# Patient Record
Sex: MALE | Race: WHITE | NOT HISPANIC OR LATINO | Employment: UNEMPLOYED | URBAN - METROPOLITAN AREA
[De-identification: names, ages, dates, MRNs, and addresses within clinical notes are randomized per-mention and may not be internally consistent; named-entity substitution may affect disease eponyms.]

---

## 2020-05-15 ENCOUNTER — OFFICE VISIT CONVERTED (OUTPATIENT)
Dept: SURGERY | Facility: CLINIC | Age: 55
End: 2020-05-15
Attending: SURGERY

## 2020-06-24 ENCOUNTER — HOSPITAL ENCOUNTER (OUTPATIENT)
Dept: PERIOP | Facility: HOSPITAL | Age: 55
Setting detail: HOSPITAL OUTPATIENT SURGERY
Discharge: HOME | End: 2020-06-24
Attending: SURGERY

## 2020-06-24 LAB
ANION GAP SERPL CALC-SCNC: 16 MMOL/L (ref 8–19)
BUN SERPL-MCNC: 10 MG/DL (ref 5–25)
BUN/CREAT SERPL: 12 {RATIO} (ref 6–20)
CALCIUM SERPL-MCNC: 9.3 MG/DL (ref 8.7–10.4)
CHLORIDE SERPL-SCNC: 103 MMOL/L (ref 99–111)
CONV CO2: 24 MMOL/L (ref 22–32)
CREAT UR-MCNC: 0.81 MG/DL (ref 0.7–1.2)
GFR SERPLBLD BASED ON 1.73 SQ M-ARVRAT: >60 ML/MIN/{1.73_M2}
GLUCOSE BLD-MCNC: 203 MG/DL (ref 70–99)
GLUCOSE SERPL-MCNC: 146 MG/DL (ref 70–99)
OSMOLALITY SERPL CALC.SUM OF ELEC: 290 MOSM/KG (ref 273–304)
POTASSIUM SERPL-SCNC: 4.1 MMOL/L (ref 3.5–5.3)
SARS-COV-2 RNA SPEC QL NAA+PROBE: NOT DETECTED
SODIUM SERPL-SCNC: 139 MMOL/L (ref 135–147)

## 2020-07-07 ENCOUNTER — OFFICE VISIT CONVERTED (OUTPATIENT)
Dept: SURGERY | Facility: CLINIC | Age: 55
End: 2020-07-07
Attending: SURGERY

## 2020-07-21 ENCOUNTER — OFFICE VISIT CONVERTED (OUTPATIENT)
Dept: SURGERY | Facility: CLINIC | Age: 55
End: 2020-07-21
Attending: SURGERY

## 2020-07-21 ENCOUNTER — CONVERSION ENCOUNTER (OUTPATIENT)
Dept: SURGERY | Facility: CLINIC | Age: 55
End: 2020-07-21

## 2020-09-28 ENCOUNTER — HOSPITAL ENCOUNTER (OUTPATIENT)
Dept: OTHER | Facility: HOSPITAL | Age: 55
Discharge: HOME | End: 2020-09-28
Attending: NURSE PRACTITIONER

## 2020-09-28 LAB — TESTOST SERPL-MCNC: 356 NG/DL (ref 193–740)

## 2020-10-01 LAB — TESTOSTERONE, FREE: 7.1 PG/ML (ref 7.2–24)

## 2021-05-10 NOTE — H&P
History and Physical      Patient Name: Hunter Guevara   Patient ID: [id number]   Sex: Male   YOB: 1965    Referring Provider: Jude MOORE    Visit Date: May 15, 2020    Provider: Melecio Burger MD   Location[facility]   Location Address: [address] [place] [address]  [id number]   Location Phone: [phone]          Chief Complaint  · Outpatient History & Physical / Surgical Orders  · Hernia Consult      History Of Present Illness     Mr. Guevara came in today for evaluation. He is a very nice gentleman who has a symptomatic left inguinal hernia and he also has a small hernia at his umbilicus.            Past Medical History  Allergic rhinitis, chronic; Diabetes; Diabetes mellitus; Gout; High blood pressure; High cholesterol; Hyperlipidemia; Hypertension; Left Shoulder Rotator Cuff Arthrosis; Lumbago/low back pain         Past Surgical History  *Metal Implant; Ankle surgery; Cyst Removal; EYE SURGERY; Tonsillectomy         Medication List  allopurinol 100 mg oral tablet; atorvastatin 20 mg oral tablet; glyburide 5 mg oral tablet; lisinopril 20 mg oral tablet; loratadine 10 mg oral tablet; metformin 500 mg oral tablet; Osteo Bi-Flex 250-200 mg oral tablet; vitamin B complex oral tablet         Allergy List  PENICILLINS         Family Medical History  Stroke; Diabetes, unspecified type; Hypertension; Coronary artery disease         Social History  Alcohol Use; Denies substance abuse; .; lives with other; Metal in your body?; Smokeless tobacco (Current every day); Tobacco (Current some day); Unemployed.         Review of Systems  · Constitutional  o Denies  o : fever  · Eyes  o Denies  o : yellowish discoloration of eyes  · HENT  o Denies  o : difficulty swallowing  · Cardiovascular  o Denies  o : chest pain on exertion  · Respiratory  o Denies  o : shortness of breath  · Gastrointestinal  o Denies  o : nausea, vomiting, diarrhea, constipation  · Integument  o Admits  o :  "skin lesion or lump  o Denies  o : rash  · Neurologic  o Denies  o : tingling or numbness  · Musculoskeletal  o Denies  o : joint pain  · Endocrine  o Denies  o : weight gain, weight loss      Vitals  Date Time BP Position Site L\R Cuff Size HR RR TEMP (F) WT  HT  BMI kg/m2 BSA m2 O2 Sat        05/15/2020 09:33 AM       16  175lbs 16oz 5'  7\" 27.57 1.94           Physical Examination  · Constitutional  o Appearance  o : well-nourished, well developed, alert, in no acute distress  · Head and Face  o Head  o :   § Inspection  § : atraumatic, normocephalic  · Neck  o Inspection/Palpation  o : supple, normal range of motion  · Respiratory  o Inspection of Chest  o : normal inspection  o Auscultation of Lungs  o : breath sounds normal, no distress, clear to ascultate bilaterally  · Cardiovascular  o Heart  o :   § Auscultation of Heart  § : regular rate and rhythm, no murmur, gallop or rub  · Gastrointestinal  o Abdominal Examination  o : normal bowel sounds, non-tender, soft. Umbilical hernia noted.  · Groin  o Groin  o : [facility] noted          Assessment  · Pre-Surgical Orders     V72.84  · Non-recurrent unilateral inguinal hernia without obstruction or gangrene     550.90/K40.90  · Hernia, Umibilical     553.1       Symptomatic left inguinal hernia. He also has a small but symptomatic umbilical hernia.       Plan  · Orders  o Surgery Order (GENOR) - V72.84, 550.90/K40.90, 553.1 - 06/24/2020  o [facility] Pre-Op Covid-19 Screening (34589) - V72.84, 550.90/K40.90, 553.1 - 06/22/2020   Scheduled at [facility] at 8am.   · Medications  o Medications have been Reconciled  o Transition of Care or Provider Policy  · Instructions  o ****Surgical Orders****  o Outpatient  o RISK AND BENEFITS:  o Consent for surgery: Given these options, the patient has verbally expressed an understanding of the risks of surgery and finds these risks acceptable. We will proceed with surgery as soon as possible.  o Consult Anesthesia for any post-operative " block, or any pain management procedure deemed necessary by the anestesiologist for adequate post-operative pain control.   o O.R. PREP: Per protocol  o SCD's preoperatively  o PLEASE SIGN PERMIT FOR: Robotic left inguinal hernia repair/ Laparoscopic umbilical hernia repair  o *__Clindamycin 900 mg IV on call to OR.  o *___The above History and Physical Examination has been completed within 30 days of admission.     We are going to set him up for a robotic left inguinal hernia. We will also fix his umbilical hernia laparoscopically. I have described the procedure to him as well as the risks and benefits and he is agreeable to proceeding.             Electronically Signed by: Susu Hopkins-, -Author on May 18, 2020 08:22:29 AM  Electronically Co-signed by: Melecio Burger MD -Reviewer on May 28, 2020 09:42:24 AM

## 2021-05-13 NOTE — PROGRESS NOTES
"   Progress Note      Patient Name: Hunter Guevara   Patient ID: [id number]   Sex: Male   YOB: 1965    Primary Care Provider: Jude MOORE   Referring Provider: Jude MOORE    Visit Date: July 7, 2020    Provider: Melecio Burger MD   Location: [facility]   Location Address: [address] [place] [address]  [id number]   Location Phone: [phone]          History Of Present Illness     Mr. Guevara came back for evaluation.  He is a very nice gentleman who had a bilateral robotic inguinal hernia repair and he also had a laparoscopic umbilical hernia repair.  He is still a bit sore but otherwise is doing okay.  He is having a little bit of issue with constipation.       Review of Systems  · Cardiovascular  o Denies  o : chest pain, irregular heart beats, rapid heart rate, chest pain on exertion, shortness of breath, lower extremity swelling  · Respiratory  o Denies  o : shortness of breath, wheezing, cough, wheezing, chronic cough, coughing up blood  · Gastrointestinal  o Denies  o : nausea, vomiting, diarrhea, chronic abdominal pain, reflux symptoms      Vitals  Date Time BP Position Site L\R Cuff Size HR RR TEMP (F) WT  HT  BMI kg/m2 BSA m2 O2 Sat HC       07/07/2020 01:16 PM       16  176lbs 16oz 5'  7\" 27.72 1.95           Physical Examination     Today on physical exam he has a seroma there in his left groin but otherwise everything looks fine.           Assessment  · Postoperative Exam Following Surgery     V67.00       Doing fine status post bilateral robotic inguinal hernia repair.  He also had a laparoscopic umbilical hernia repair.       Plan  · Medications  o Medications have been Reconciled  o Transition of Care or Provider Policy  · Instructions  o Follow up in 2 weeks.     We will see him back in two weeks.  We will go ahead and prescribe him some Colace for a stool softener.             Electronically Signed by: Bridgett Scott-, Other -Author on " July 8, 2020 07:19:30 PM  Electronically Co-signed by: Melecio Burger MD -Reviewer on July 12, 2020 09:08:23 AM

## 2021-05-13 NOTE — PROGRESS NOTES
Progress Note      Patient Name: Hunter Guevara   Patient ID: [id number]   Sex: Male   YOB: 1965    Primary Care Provider: Jude MOORE   Referring Provider: Jude MOORE    Visit Date: July 21, 2020    Provider: Melecio Burger MD   Location: [facility]   Location Address: [address] [place] [address]  [id number]   Location Phone: [phone]          Chief Complaint  · Follow Up Office Visit      History Of Present Illness     Hutner came back for follow-up. He is doing well following a robotic repair of a bilateral inguinal hernia and a laparoscopic umbilical hernia repair. He is doing great. His soreness has basically resolved. He is able to get out and pretty mobile.       Past Medical History  Allergic rhinitis, chronic; Diabetes; Diabetes mellitus; Gout; High blood pressure; High cholesterol; Hyperlipidemia; Hypertension; Left Shoulder Rotator Cuff Arthrosis; Lumbago/low back pain         Past Surgical History  *Metal Implant; Ankle surgery; Cyst Removal; EYE SURGERY; Hernia; Tonsillectomy         Medication List  allopurinol 100 mg oral tablet; atorvastatin 20 mg oral tablet; Colace 100 mg oral capsule; glyburide 5 mg oral tablet; lisinopril 20 mg oral tablet; loratadine 10 mg oral tablet; metformin 500 mg oral tablet; Osteo Bi-Flex 250-200 mg oral tablet; vitamin B complex oral tablet         Allergy List  PENICILLINS         Family Medical History  Stroke; Diabetes, unspecified type; Hypertension; Coronary artery disease         Social History  Alcohol Use; Denies substance abuse; .; lives with other; Metal in your body?; Smokeless tobacco (Current every day); Tobacco (Current some day); Unemployed.         Review of Systems  · Cardiovascular  o Denies  o : chest pain, irregular heart beats, rapid heart rate, chest pain on exertion, shortness of breath, lower extremity swelling  · Respiratory  o Denies  o : shortness of breath, wheezing, cough, wheezing,  "chronic cough, coughing up blood  · Gastrointestinal  o Denies  o : nausea, vomiting, diarrhea, chronic abdominal pain, reflux symptoms      Vitals  Date Time BP Position Site L\R Cuff Size HR RR TEMP (F) WT  HT  BMI kg/m2 BSA m2 O2 Sat HC       07/21/2020 01:03 PM       16  176lbs 16oz 5'  7\" 27.72 1.95           Physical Examination     Today on physical exam, all of his incisions look good.           Assessment  · Postoperative Exam Following Surgery     V67.00       Doing great status post robotic bilateral inguinal hernia repair as well as a laparoscopic umbilical hernia repair.       Plan  · Medications  o Medications have been Reconciled  o Transition of Care or Provider Policy  · Instructions  o Follow up as needed.            Electronically Signed by: Susu Hopkins-, -Author on July 22, 2020 10:01:35 AM  Electronically Co-signed by: Melecio Burger MD -Reviewer on July 27, 2020 02:34:38 PM  "

## 2021-05-15 VITALS — RESPIRATION RATE: 16 BRPM | HEIGHT: 67 IN | WEIGHT: 177 LBS | BODY MASS INDEX: 27.78 KG/M2

## 2021-05-15 VITALS — HEIGHT: 67 IN | BODY MASS INDEX: 27.62 KG/M2 | WEIGHT: 176 LBS | RESPIRATION RATE: 16 BRPM

## 2021-05-15 VITALS — RESPIRATION RATE: 16 BRPM | WEIGHT: 177 LBS | BODY MASS INDEX: 27.78 KG/M2 | HEIGHT: 67 IN

## 2022-03-28 ENCOUNTER — PREP FOR SURGERY (OUTPATIENT)
Dept: OTHER | Facility: HOSPITAL | Age: 57
End: 2022-03-28

## 2022-03-28 ENCOUNTER — OFFICE VISIT (OUTPATIENT)
Dept: ORTHOPEDIC SURGERY | Facility: CLINIC | Age: 57
End: 2022-03-28

## 2022-03-28 VITALS — HEART RATE: 78 BPM | OXYGEN SATURATION: 97 % | BODY MASS INDEX: 28.25 KG/M2 | WEIGHT: 180 LBS | HEIGHT: 67 IN

## 2022-03-28 DIAGNOSIS — M17.12 PRIMARY OSTEOARTHRITIS OF LEFT KNEE: Primary | ICD-10-CM

## 2022-03-28 DIAGNOSIS — M25.562 LEFT KNEE PAIN, UNSPECIFIED CHRONICITY: ICD-10-CM

## 2022-03-28 PROCEDURE — 99204 OFFICE O/P NEW MOD 45 MIN: CPT | Performed by: ORTHOPAEDIC SURGERY

## 2022-03-28 RX ORDER — MULTIPLE VITAMINS W/ MINERALS TAB 9MG-400MCG
1 TAB ORAL DAILY
COMMUNITY

## 2022-03-28 RX ORDER — POVIDONE-IODINE 10 MG/ML
SOLUTION TOPICAL ONCE
Status: CANCELLED | OUTPATIENT
Start: 2022-03-28 | End: 2022-03-28

## 2022-03-28 RX ORDER — ERGOCALCIFEROL 1.25 MG/1
50000 CAPSULE ORAL
COMMUNITY
Start: 2022-02-10

## 2022-03-28 RX ORDER — TRANEXAMIC ACID 10 MG/ML
1000 INJECTION, SOLUTION INTRAVENOUS ONCE
Status: CANCELLED | OUTPATIENT
Start: 2022-03-28 | End: 2022-03-28

## 2022-03-28 RX ORDER — ATORVASTATIN CALCIUM 40 MG/1
40 TABLET, FILM COATED ORAL NIGHTLY
COMMUNITY
Start: 2022-01-19

## 2022-03-28 RX ORDER — CEFAZOLIN SODIUM 2 G/100ML
2 INJECTION, SOLUTION INTRAVENOUS ONCE
Status: CANCELLED | OUTPATIENT
Start: 2022-03-28 | End: 2022-03-28

## 2022-03-28 RX ORDER — GLYBURIDE 5 MG/1
5 TABLET ORAL
COMMUNITY
Start: 2022-01-19

## 2022-03-28 RX ORDER — CEFAZOLIN SODIUM IN 0.9 % NACL 3 G/100 ML
3 INTRAVENOUS SOLUTION, PIGGYBACK (ML) INTRAVENOUS ONCE
Status: CANCELLED | OUTPATIENT
Start: 2022-03-28 | End: 2022-03-28

## 2022-03-28 RX ORDER — ALLOPURINOL 100 MG/1
100 TABLET ORAL DAILY
COMMUNITY
Start: 2022-01-19

## 2022-03-28 RX ORDER — LISINOPRIL 20 MG/1
20 TABLET ORAL DAILY
COMMUNITY
Start: 2022-01-19

## 2022-03-28 RX ORDER — TESTOSTERONE CYPIONATE 200 MG/ML
INJECTION, SOLUTION INTRAMUSCULAR
COMMUNITY
Start: 2022-02-11 | End: 2022-05-31

## 2022-03-28 NOTE — PROGRESS NOTES
"Chief Complaint  Initial Evaluation of the Left Knee     Subjective      Hunter Guevaar presents to [facility] for an evaluation of left knee. Patient has pain and swelling in the left knee. Patient has difficulty with steps and prolonged ambulation. He does limp due to the pain. He has difficulty with performing ADLs.     Allergies   Allergen Reactions   • Penicillins Hives        Social History     Socioeconomic History   • Marital status:    Tobacco Use   • Smoking status: Current Every Day Smoker     Types: Cigars   • Smokeless tobacco: Former User        Review of Systems     Objective   Vital Signs:   Pulse 78   Ht 170.2 cm (67\")   Wt 81.6 kg (180 lb)   SpO2 97%   BMI 28.19 kg/m²       Physical Exam  Constitutional:       Appearance: Normal appearance. Patient is well-developed and normal weight.   HENT:      Head: Normocephalic.      Right Ear: Hearing and external ear normal.      Left Ear: Hearing and external ear normal.      Nose: Nose normal.   Eyes:      Conjunctiva/sclera: Conjunctivae normal.   Cardiovascular:      Rate and Rhythm: Normal rate.   Pulmonary:      Effort: Pulmonary effort is normal.      Breath sounds: No wheezing or rales.   Abdominal:      Palpations: Abdomen is soft.      Tenderness: There is no abdominal tenderness.   Musculoskeletal:      Cervical back: Normal range of motion.   Skin:     Findings: No rash.   Neurological:      Mental Status: Patient  is alert and oriented to person, place, and time.   Psychiatric:         Mood and Affect: Mood and affect normal.         Judgment: Judgment normal.       Ortho Exam      LEFT KNEE: Calf supple, non-tender, no signs of DVT. Dorsal Pedal Pulse 2+, posterior tibialis pulse 2+. Good strength to hamstrings, quadriceps, dorsiflexors and plantar flexors. Stable to varus/valgus stress. Stable anterior and posterior drawer. Tender medial and lateral joint line. Varus alignment. Full extension. " Flexion to 115 degrees.       Procedures        Imaging Results (Most Recent)     Procedure Component Value Units Date/Time    XR Knee 3 View Left [[id number]] Resulted: 03/28/22 1405     Updated: 03/28/22 1408           Result Review :     X-Ray Report:  Left knee(s) X-Ray  Indication: Evaluation of left knee pain   AP, Lateral and Standing view(s)  Findings: Severe osteoarthritis of the left knee. No fracture or dislocation.   Prior studies available for comparison: no     Assessment and Plan     DX: left knee osteoarthritis     Discussed treatment plans and diagnosis with the patient. He is a candidate for a left total knee replacement. Patient wishes to proceed with a left total knee replacement.     Discussed surgery., Risks/benefits discussed with patient including, but not limited to: infection, bleeding, neurovascular damage, malunion, nonunion, aesthetic deformity, need for further surgery, and death., Discussed with patient the implant type being used during surgery and patient understands and desires to proceed. and Surgery pamphlet given.    Follow Up     Post-operatively.       Patient was given instructions and counseling regarding his condition or for health maintenance advice. Please see specific information pulled into the AVS if appropriate.     Scribed for Ricco Ruelas MD by Sloane Evans.  03/28/22   14:18 EDT    I have personally performed the services described in this document as scribed by the above individual and it is both accurate and complete. Ricco Ruelas MD 03/28/22

## 2022-05-18 DIAGNOSIS — Z96.652 AFTERCARE FOLLOWING LEFT KNEE JOINT REPLACEMENT SURGERY: Primary | ICD-10-CM

## 2022-05-18 DIAGNOSIS — Z47.1 AFTERCARE FOLLOWING LEFT KNEE JOINT REPLACEMENT SURGERY: Primary | ICD-10-CM

## 2022-05-31 ENCOUNTER — PRE-ADMISSION TESTING (OUTPATIENT)
Dept: PREADMISSION TESTING | Facility: HOSPITAL | Age: 57
End: 2022-05-31

## 2022-05-31 LAB
ALBUMIN SERPL-MCNC: 4.3 G/DL (ref 3.5–5.2)
ALBUMIN/GLOB SERPL: 1.6 G/DL
ALP SERPL-CCNC: 104 U/L (ref 39–117)
ALT SERPL W P-5'-P-CCNC: 29 U/L (ref 1–41)
ANION GAP SERPL CALCULATED.3IONS-SCNC: 13.4 MMOL/L (ref 5–15)
AST SERPL-CCNC: 25 U/L (ref 1–40)
BASOPHILS # BLD AUTO: 0.07 10*3/MM3 (ref 0–0.2)
BASOPHILS NFR BLD AUTO: 0.8 % (ref 0–1.5)
BILIRUB SERPL-MCNC: 0.8 MG/DL (ref 0–1.2)
BUN SERPL-MCNC: 24 MG/DL (ref 6–20)
BUN/CREAT SERPL: 21.8 (ref 7–25)
CALCIUM SPEC-SCNC: 9.8 MG/DL (ref 8.6–10.5)
CHLORIDE SERPL-SCNC: 102 MMOL/L (ref 98–107)
CO2 SERPL-SCNC: 23.6 MMOL/L (ref 22–29)
CREAT SERPL-MCNC: 1.1 MG/DL (ref 0.76–1.27)
DEPRECATED RDW RBC AUTO: 41.1 FL (ref 37–54)
EGFRCR SERPLBLD CKD-EPI 2021: 78.8 ML/MIN/1.73
EOSINOPHIL # BLD AUTO: 0.12 10*3/MM3 (ref 0–0.4)
EOSINOPHIL NFR BLD AUTO: 1.4 % (ref 0.3–6.2)
ERYTHROCYTE [DISTWIDTH] IN BLOOD BY AUTOMATED COUNT: 13.1 % (ref 12.3–15.4)
GLOBULIN UR ELPH-MCNC: 2.7 GM/DL
GLUCOSE SERPL-MCNC: 224 MG/DL (ref 65–99)
HBA1C MFR BLD: 7.4 % (ref 4.8–5.6)
HCT VFR BLD AUTO: 43.1 % (ref 37.5–51)
HGB BLD-MCNC: 15.5 G/DL (ref 13–17.7)
IMM GRANULOCYTES # BLD AUTO: 0.04 10*3/MM3 (ref 0–0.05)
IMM GRANULOCYTES NFR BLD AUTO: 0.5 % (ref 0–0.5)
INR PPP: 1.01 (ref 0.86–1.15)
LYMPHOCYTES # BLD AUTO: 2.46 10*3/MM3 (ref 0.7–3.1)
LYMPHOCYTES NFR BLD AUTO: 28.7 % (ref 19.6–45.3)
MCH RBC QN AUTO: 31.4 PG (ref 26.6–33)
MCHC RBC AUTO-ENTMCNC: 36 G/DL (ref 31.5–35.7)
MCV RBC AUTO: 87.2 FL (ref 79–97)
MONOCYTES # BLD AUTO: 0.66 10*3/MM3 (ref 0.1–0.9)
MONOCYTES NFR BLD AUTO: 7.7 % (ref 5–12)
NEUTROPHILS NFR BLD AUTO: 5.23 10*3/MM3 (ref 1.7–7)
NEUTROPHILS NFR BLD AUTO: 60.9 % (ref 42.7–76)
NRBC BLD AUTO-RTO: 0 /100 WBC (ref 0–0.2)
PLATELET # BLD AUTO: 188 10*3/MM3 (ref 140–450)
PMV BLD AUTO: 10.6 FL (ref 6–12)
POTASSIUM SERPL-SCNC: 4.5 MMOL/L (ref 3.5–5.2)
PROT SERPL-MCNC: 7 G/DL (ref 6–8.5)
PROTHROMBIN TIME: 13.5 SECONDS (ref 11.8–14.9)
QT INTERVAL: 384 MS
RBC # BLD AUTO: 4.94 10*6/MM3 (ref 4.14–5.8)
SODIUM SERPL-SCNC: 139 MMOL/L (ref 136–145)
WBC NRBC COR # BLD: 8.58 10*3/MM3 (ref 3.4–10.8)

## 2022-05-31 PROCEDURE — 83036 HEMOGLOBIN GLYCOSYLATED A1C: CPT | Performed by: ORTHOPAEDIC SURGERY

## 2022-05-31 PROCEDURE — 85610 PROTHROMBIN TIME: CPT | Performed by: ORTHOPAEDIC SURGERY

## 2022-05-31 PROCEDURE — 85025 COMPLETE CBC W/AUTO DIFF WBC: CPT | Performed by: ORTHOPAEDIC SURGERY

## 2022-05-31 PROCEDURE — 80053 COMPREHEN METABOLIC PANEL: CPT | Performed by: ORTHOPAEDIC SURGERY

## 2022-05-31 PROCEDURE — 93005 ELECTROCARDIOGRAM TRACING: CPT | Performed by: ORTHOPAEDIC SURGERY

## 2022-05-31 RX ORDER — CHLORAL HYDRATE 500 MG
2000 CAPSULE ORAL 2 TIMES DAILY
COMMUNITY
End: 2022-06-07 | Stop reason: HOSPADM

## 2022-05-31 ASSESSMENT — KOOS JR
KOOS JR SCORE: 19
KOOS JR SCORE: 39.625

## 2022-06-02 ENCOUNTER — LAB (OUTPATIENT)
Dept: LAB | Facility: HOSPITAL | Age: 57
End: 2022-06-02

## 2022-06-02 DIAGNOSIS — M17.12 PRIMARY OSTEOARTHRITIS OF LEFT KNEE: ICD-10-CM

## 2022-06-02 LAB — SARS-COV-2 RNA PNL SPEC NAA+PROBE: NOT DETECTED

## 2022-06-02 PROCEDURE — U0004 COV-19 TEST NON-CDC HGH THRU: HCPCS

## 2022-06-07 ENCOUNTER — ANESTHESIA (OUTPATIENT)
Dept: PERIOP | Facility: HOSPITAL | Age: 57
End: 2022-06-07

## 2022-06-07 ENCOUNTER — ANESTHESIA EVENT (OUTPATIENT)
Dept: PERIOP | Facility: HOSPITAL | Age: 57
End: 2022-06-07

## 2022-06-07 ENCOUNTER — HOSPITAL ENCOUNTER (OUTPATIENT)
Facility: HOSPITAL | Age: 57
Discharge: HOME | End: 2022-06-07
Attending: ORTHOPAEDIC SURGERY | Admitting: ORTHOPAEDIC SURGERY

## 2022-06-07 ENCOUNTER — APPOINTMENT (OUTPATIENT)
Dept: GENERAL RADIOLOGY | Facility: HOSPITAL | Age: 57
End: 2022-06-07

## 2022-06-07 VITALS
WEIGHT: 168.87 LBS | OXYGEN SATURATION: 97 % | HEIGHT: 66 IN | HEART RATE: 75 BPM | SYSTOLIC BLOOD PRESSURE: 97 MMHG | BODY MASS INDEX: 27.14 KG/M2 | RESPIRATION RATE: 16 BRPM | TEMPERATURE: 97.9 F | DIASTOLIC BLOOD PRESSURE: 57 MMHG

## 2022-06-07 DIAGNOSIS — M17.12 PRIMARY OSTEOARTHRITIS OF LEFT KNEE: ICD-10-CM

## 2022-06-07 DIAGNOSIS — R26.2 DIFFICULTY IN WALKING: ICD-10-CM

## 2022-06-07 DIAGNOSIS — Z78.9 DECREASED ACTIVITIES OF DAILY LIVING (ADL): Primary | ICD-10-CM

## 2022-06-07 LAB
GLUCOSE BLDC GLUCOMTR-MCNC: 252 MG/DL (ref 70–99)
GLUCOSE BLDC GLUCOMTR-MCNC: 255 MG/DL (ref 70–99)

## 2022-06-07 PROCEDURE — 25010000002 HYDROMORPHONE 1 MG/ML SOLUTION: Performed by: NURSE ANESTHETIST, CERTIFIED REGISTERED

## 2022-06-07 PROCEDURE — 97165 OT EVAL LOW COMPLEX 30 MIN: CPT

## 2022-06-07 PROCEDURE — 25010000002 ROPIVACAINE PER 1 MG: Performed by: ORTHOPAEDIC SURGERY

## 2022-06-07 PROCEDURE — 82962 GLUCOSE BLOOD TEST: CPT

## 2022-06-07 PROCEDURE — 20985 CPTR-ASST DIR MS PX: CPT | Performed by: ORTHOPAEDIC SURGERY

## 2022-06-07 PROCEDURE — 25010000002 MORPHINE (PF) 10 MG/ML SOLUTION: Performed by: ORTHOPAEDIC SURGERY

## 2022-06-07 PROCEDURE — 25010000002 KETOROLAC TROMETHAMINE PER 15 MG: Performed by: ORTHOPAEDIC SURGERY

## 2022-06-07 PROCEDURE — C1713 ANCHOR/SCREW BN/BN,TIS/BN: HCPCS | Performed by: ORTHOPAEDIC SURGERY

## 2022-06-07 PROCEDURE — 25010000002 CEFAZOLIN IN DEXTROSE 2-4 GM/100ML-% SOLUTION: Performed by: ORTHOPAEDIC SURGERY

## 2022-06-07 PROCEDURE — 25010000002 PROPOFOL 10 MG/ML EMULSION: Performed by: NURSE ANESTHETIST, CERTIFIED REGISTERED

## 2022-06-07 PROCEDURE — 76942 ECHO GUIDE FOR BIOPSY: CPT | Performed by: ORTHOPAEDIC SURGERY

## 2022-06-07 PROCEDURE — 25010000002 ONDANSETRON PER 1 MG: Performed by: NURSE ANESTHETIST, CERTIFIED REGISTERED

## 2022-06-07 PROCEDURE — 73560 X-RAY EXAM OF KNEE 1 OR 2: CPT

## 2022-06-07 PROCEDURE — 25010000002 EPINEPHRINE 1 MG/ML SOLUTION: Performed by: ORTHOPAEDIC SURGERY

## 2022-06-07 PROCEDURE — 97161 PT EVAL LOW COMPLEX 20 MIN: CPT

## 2022-06-07 PROCEDURE — 27447 TOTAL KNEE ARTHROPLASTY: CPT | Performed by: ORTHOPAEDIC SURGERY

## 2022-06-07 PROCEDURE — 25010000002 DEXAMETHASONE PER 1 MG: Performed by: NURSE ANESTHETIST, CERTIFIED REGISTERED

## 2022-06-07 PROCEDURE — 25010000002 MIDAZOLAM PER 1 MG: Performed by: ANESTHESIOLOGY

## 2022-06-07 PROCEDURE — C1776 JOINT DEVICE (IMPLANTABLE): HCPCS | Performed by: ORTHOPAEDIC SURGERY

## 2022-06-07 PROCEDURE — 25010000002 FENTANYL CITRATE (PF) 50 MCG/ML SOLUTION: Performed by: NURSE ANESTHETIST, CERTIFIED REGISTERED

## 2022-06-07 DEVICE — IMPLANTABLE DEVICE: Type: IMPLANTABLE DEVICE | Site: KNEE | Status: FUNCTIONAL

## 2022-06-07 DEVICE — ART/SRF KN PERSONA/VE PS EF 8TO11 10MM LT: Type: IMPLANTABLE DEVICE | Site: KNEE | Status: FUNCTIONAL

## 2022-06-07 DEVICE — COMP FEM/KN PERSONA CR CMT COCR STD SZ9 LT: Type: IMPLANTABLE DEVICE | Site: KNEE | Status: FUNCTIONAL

## 2022-06-07 DEVICE — CMT BONE PALACOS R HI/VISC 1X40: Type: IMPLANTABLE DEVICE | Site: KNEE | Status: FUNCTIONAL

## 2022-06-07 DEVICE — CAP TOTL KN CMT PRIMARY: Type: IMPLANTABLE DEVICE | Site: KNEE | Status: FUNCTIONAL

## 2022-06-07 DEVICE — STEM TIB/KN PERSONA CMT 5D SZF LT: Type: IMPLANTABLE DEVICE | Site: KNEE | Status: FUNCTIONAL

## 2022-06-07 RX ORDER — AMOXICILLIN 250 MG
2 CAPSULE ORAL 2 TIMES DAILY PRN
Status: DISCONTINUED | OUTPATIENT
Start: 2022-06-07 | End: 2022-06-07 | Stop reason: HOSPADM

## 2022-06-07 RX ORDER — SODIUM CHLORIDE 0.9 % (FLUSH) 0.9 %
10 SYRINGE (ML) INJECTION AS NEEDED
Status: DISCONTINUED | OUTPATIENT
Start: 2022-06-07 | End: 2022-06-07 | Stop reason: HOSPADM

## 2022-06-07 RX ORDER — POVIDONE-IODINE 10 MG/ML
SOLUTION TOPICAL ONCE
Status: DISCONTINUED | OUTPATIENT
Start: 2022-06-07 | End: 2022-06-07 | Stop reason: HOSPADM

## 2022-06-07 RX ORDER — ONDANSETRON 2 MG/ML
4 INJECTION INTRAMUSCULAR; INTRAVENOUS ONCE AS NEEDED
Status: DISCONTINUED | OUTPATIENT
Start: 2022-06-07 | End: 2022-06-07 | Stop reason: HOSPADM

## 2022-06-07 RX ORDER — LIDOCAINE HYDROCHLORIDE 20 MG/ML
INJECTION, SOLUTION EPIDURAL; INFILTRATION; INTRACAUDAL; PERINEURAL AS NEEDED
Status: DISCONTINUED | OUTPATIENT
Start: 2022-06-07 | End: 2022-06-07 | Stop reason: SURG

## 2022-06-07 RX ORDER — SODIUM CHLORIDE 0.9 % (FLUSH) 0.9 %
10 SYRINGE (ML) INJECTION EVERY 12 HOURS SCHEDULED
Status: DISCONTINUED | OUTPATIENT
Start: 2022-06-07 | End: 2022-06-07 | Stop reason: HOSPADM

## 2022-06-07 RX ORDER — OXYCODONE HYDROCHLORIDE 5 MG/1
5 TABLET ORAL
Status: COMPLETED | OUTPATIENT
Start: 2022-06-07 | End: 2022-06-07

## 2022-06-07 RX ORDER — MEPERIDINE HYDROCHLORIDE 25 MG/ML
12.5 INJECTION INTRAMUSCULAR; INTRAVENOUS; SUBCUTANEOUS
Status: DISCONTINUED | OUTPATIENT
Start: 2022-06-07 | End: 2022-06-07 | Stop reason: HOSPADM

## 2022-06-07 RX ORDER — ACETAMINOPHEN 500 MG
1000 TABLET ORAL EVERY 6 HOURS
Status: DISCONTINUED | OUTPATIENT
Start: 2022-06-07 | End: 2022-06-07 | Stop reason: HOSPADM

## 2022-06-07 RX ORDER — TRANEXAMIC ACID 10 MG/ML
1000 INJECTION, SOLUTION INTRAVENOUS ONCE
Status: COMPLETED | OUTPATIENT
Start: 2022-06-07 | End: 2022-06-07

## 2022-06-07 RX ORDER — GLYCOPYRROLATE 0.2 MG/ML
0.2 INJECTION INTRAMUSCULAR; INTRAVENOUS
Status: COMPLETED | OUTPATIENT
Start: 2022-06-07 | End: 2022-06-07

## 2022-06-07 RX ORDER — MIDAZOLAM HYDROCHLORIDE 1 MG/ML
2 INJECTION INTRAMUSCULAR; INTRAVENOUS ONCE
Status: COMPLETED | OUTPATIENT
Start: 2022-06-07 | End: 2022-06-07

## 2022-06-07 RX ORDER — CEFAZOLIN SODIUM 2 G/100ML
2 INJECTION, SOLUTION INTRAVENOUS ONCE
Status: COMPLETED | OUTPATIENT
Start: 2022-06-07 | End: 2022-06-07

## 2022-06-07 RX ORDER — ONDANSETRON 4 MG/1
4 TABLET, FILM COATED ORAL EVERY 6 HOURS PRN
Status: DISCONTINUED | OUTPATIENT
Start: 2022-06-07 | End: 2022-06-07 | Stop reason: HOSPADM

## 2022-06-07 RX ORDER — BISACODYL 10 MG
10 SUPPOSITORY, RECTAL RECTAL DAILY PRN
Status: DISCONTINUED | OUTPATIENT
Start: 2022-06-07 | End: 2022-06-07 | Stop reason: HOSPADM

## 2022-06-07 RX ORDER — CELECOXIB 100 MG/1
200 CAPSULE ORAL ONCE
Status: COMPLETED | OUTPATIENT
Start: 2022-06-07 | End: 2022-06-07

## 2022-06-07 RX ORDER — TRANEXAMIC ACID 10 MG/ML
1000 INJECTION, SOLUTION INTRAVENOUS ONCE
Status: DISCONTINUED | OUTPATIENT
Start: 2022-06-07 | End: 2022-06-07 | Stop reason: HOSPADM

## 2022-06-07 RX ORDER — KETOROLAC TROMETHAMINE 15 MG/ML
15 INJECTION, SOLUTION INTRAMUSCULAR; INTRAVENOUS EVERY 6 HOURS
Status: DISCONTINUED | OUTPATIENT
Start: 2022-06-07 | End: 2022-06-07 | Stop reason: HOSPADM

## 2022-06-07 RX ORDER — FENTANYL CITRATE 50 UG/ML
INJECTION, SOLUTION INTRAMUSCULAR; INTRAVENOUS AS NEEDED
Status: DISCONTINUED | OUTPATIENT
Start: 2022-06-07 | End: 2022-06-07 | Stop reason: SURG

## 2022-06-07 RX ORDER — BUPIVACAINE HYDROCHLORIDE AND EPINEPHRINE 5; 5 MG/ML; UG/ML
INJECTION, SOLUTION EPIDURAL; INTRACAUDAL; PERINEURAL
Status: COMPLETED | OUTPATIENT
Start: 2022-06-07 | End: 2022-06-07

## 2022-06-07 RX ORDER — HYDROCODONE BITARTRATE AND ACETAMINOPHEN 7.5; 325 MG/1; MG/1
1 TABLET ORAL EVERY 4 HOURS PRN
Status: DISCONTINUED | OUTPATIENT
Start: 2022-06-07 | End: 2022-06-07 | Stop reason: HOSPADM

## 2022-06-07 RX ORDER — MAGNESIUM HYDROXIDE 1200 MG/15ML
LIQUID ORAL AS NEEDED
Status: DISCONTINUED | OUTPATIENT
Start: 2022-06-07 | End: 2022-06-07 | Stop reason: HOSPADM

## 2022-06-07 RX ORDER — OXYCODONE AND ACETAMINOPHEN 7.5; 325 MG/1; MG/1
1 TABLET ORAL EVERY 4 HOURS PRN
Qty: 45 TABLET | Refills: 0 | Status: SHIPPED | OUTPATIENT
Start: 2022-06-07 | End: 2022-06-10 | Stop reason: SDUPTHER

## 2022-06-07 RX ORDER — TRANEXAMIC ACID 100 MG/ML
INJECTION, SOLUTION INTRAVENOUS AS NEEDED
Status: DISCONTINUED | OUTPATIENT
Start: 2022-06-07 | End: 2022-06-07 | Stop reason: SURG

## 2022-06-07 RX ORDER — ASPIRIN 325 MG
325 TABLET, DELAYED RELEASE (ENTERIC COATED) ORAL DAILY
Qty: 21 TABLET | Refills: 0 | Status: SHIPPED | OUTPATIENT
Start: 2022-06-07

## 2022-06-07 RX ORDER — PROMETHAZINE HYDROCHLORIDE 25 MG/1
25 SUPPOSITORY RECTAL ONCE AS NEEDED
Status: DISCONTINUED | OUTPATIENT
Start: 2022-06-07 | End: 2022-06-07 | Stop reason: HOSPADM

## 2022-06-07 RX ORDER — NALOXONE HCL 0.4 MG/ML
0.4 VIAL (ML) INJECTION
Status: DISCONTINUED | OUTPATIENT
Start: 2022-06-07 | End: 2022-06-07 | Stop reason: HOSPADM

## 2022-06-07 RX ORDER — ROCURONIUM BROMIDE 10 MG/ML
INJECTION, SOLUTION INTRAVENOUS AS NEEDED
Status: DISCONTINUED | OUTPATIENT
Start: 2022-06-07 | End: 2022-06-07 | Stop reason: SURG

## 2022-06-07 RX ORDER — PHENYLEPHRINE HCL IN 0.9% NACL 1 MG/10 ML
SYRINGE (ML) INTRAVENOUS AS NEEDED
Status: DISCONTINUED | OUTPATIENT
Start: 2022-06-07 | End: 2022-06-07 | Stop reason: SURG

## 2022-06-07 RX ORDER — ONDANSETRON 2 MG/ML
4 INJECTION INTRAMUSCULAR; INTRAVENOUS EVERY 6 HOURS PRN
Status: DISCONTINUED | OUTPATIENT
Start: 2022-06-07 | End: 2022-06-07 | Stop reason: HOSPADM

## 2022-06-07 RX ORDER — KETAMINE HYDROCHLORIDE 50 MG/ML
INJECTION, SOLUTION, CONCENTRATE INTRAMUSCULAR; INTRAVENOUS AS NEEDED
Status: DISCONTINUED | OUTPATIENT
Start: 2022-06-07 | End: 2022-06-07 | Stop reason: SURG

## 2022-06-07 RX ORDER — PROMETHAZINE HYDROCHLORIDE 12.5 MG/1
25 TABLET ORAL ONCE AS NEEDED
Status: DISCONTINUED | OUTPATIENT
Start: 2022-06-07 | End: 2022-06-07 | Stop reason: HOSPADM

## 2022-06-07 RX ORDER — BISACODYL 5 MG/1
10 TABLET, DELAYED RELEASE ORAL DAILY PRN
Status: DISCONTINUED | OUTPATIENT
Start: 2022-06-07 | End: 2022-06-07 | Stop reason: HOSPADM

## 2022-06-07 RX ORDER — DEXAMETHASONE SODIUM PHOSPHATE 4 MG/ML
INJECTION, SOLUTION INTRA-ARTICULAR; INTRALESIONAL; INTRAMUSCULAR; INTRAVENOUS; SOFT TISSUE AS NEEDED
Status: DISCONTINUED | OUTPATIENT
Start: 2022-06-07 | End: 2022-06-07 | Stop reason: SURG

## 2022-06-07 RX ORDER — CEFAZOLIN SODIUM 2 G/100ML
2 INJECTION, SOLUTION INTRAVENOUS EVERY 8 HOURS
Status: DISCONTINUED | OUTPATIENT
Start: 2022-06-07 | End: 2022-06-07 | Stop reason: HOSPADM

## 2022-06-07 RX ORDER — PROPOFOL 10 MG/ML
VIAL (ML) INTRAVENOUS AS NEEDED
Status: DISCONTINUED | OUTPATIENT
Start: 2022-06-07 | End: 2022-06-07 | Stop reason: SURG

## 2022-06-07 RX ORDER — SODIUM CHLORIDE, SODIUM LACTATE, POTASSIUM CHLORIDE, CALCIUM CHLORIDE 600; 310; 30; 20 MG/100ML; MG/100ML; MG/100ML; MG/100ML
9 INJECTION, SOLUTION INTRAVENOUS CONTINUOUS PRN
Status: DISCONTINUED | OUTPATIENT
Start: 2022-06-07 | End: 2022-06-07 | Stop reason: HOSPADM

## 2022-06-07 RX ORDER — HYDROCODONE BITARTRATE AND ACETAMINOPHEN 7.5; 325 MG/1; MG/1
2 TABLET ORAL EVERY 4 HOURS PRN
Status: DISCONTINUED | OUTPATIENT
Start: 2022-06-07 | End: 2022-06-07 | Stop reason: HOSPADM

## 2022-06-07 RX ORDER — SODIUM CHLORIDE, SODIUM LACTATE, POTASSIUM CHLORIDE, CALCIUM CHLORIDE 600; 310; 30; 20 MG/100ML; MG/100ML; MG/100ML; MG/100ML
80 INJECTION, SOLUTION INTRAVENOUS CONTINUOUS
Status: DISCONTINUED | OUTPATIENT
Start: 2022-06-07 | End: 2022-06-07 | Stop reason: HOSPADM

## 2022-06-07 RX ORDER — CEFAZOLIN SODIUM IN 0.9 % NACL 3 G/100 ML
3 INTRAVENOUS SOLUTION, PIGGYBACK (ML) INTRAVENOUS ONCE
Status: DISCONTINUED | OUTPATIENT
Start: 2022-06-07 | End: 2022-06-07

## 2022-06-07 RX ORDER — ONDANSETRON 2 MG/ML
INJECTION INTRAMUSCULAR; INTRAVENOUS AS NEEDED
Status: DISCONTINUED | OUTPATIENT
Start: 2022-06-07 | End: 2022-06-07 | Stop reason: SURG

## 2022-06-07 RX ORDER — ACETAMINOPHEN 500 MG
1000 TABLET ORAL ONCE
Status: COMPLETED | OUTPATIENT
Start: 2022-06-07 | End: 2022-06-07

## 2022-06-07 RX ADMIN — TRANEXAMIC ACID 1000 MG: 100 INJECTION, SOLUTION INTRAVENOUS at 09:17

## 2022-06-07 RX ADMIN — PROPOFOL 180 MG: 10 INJECTION, EMULSION INTRAVENOUS at 08:06

## 2022-06-07 RX ADMIN — OXYCODONE HYDROCHLORIDE 5 MG: 5 TABLET ORAL at 10:01

## 2022-06-07 RX ADMIN — Medication 200 MCG: at 08:24

## 2022-06-07 RX ADMIN — Medication 200 MCG: at 08:18

## 2022-06-07 RX ADMIN — ROCURONIUM BROMIDE 50 MG: 10 INJECTION INTRAVENOUS at 08:07

## 2022-06-07 RX ADMIN — KETOROLAC TROMETHAMINE 15 MG: 15 INJECTION, SOLUTION INTRAMUSCULAR; INTRAVENOUS at 12:14

## 2022-06-07 RX ADMIN — MIDAZOLAM HYDROCHLORIDE 2 MG: 1 INJECTION, SOLUTION INTRAMUSCULAR; INTRAVENOUS at 07:13

## 2022-06-07 RX ADMIN — ACETAMINOPHEN 1000 MG: 500 TABLET ORAL at 12:14

## 2022-06-07 RX ADMIN — KETAMINE HYDROCHLORIDE 5 MG: 50 INJECTION, SOLUTION INTRAMUSCULAR; INTRAVENOUS at 08:16

## 2022-06-07 RX ADMIN — SODIUM CHLORIDE, POTASSIUM CHLORIDE, SODIUM LACTATE AND CALCIUM CHLORIDE 9 ML/HR: 600; 310; 30; 20 INJECTION, SOLUTION INTRAVENOUS at 06:51

## 2022-06-07 RX ADMIN — GLYCOPYRROLATE 0.2 MG: 0.2 INJECTION INTRAMUSCULAR; INTRAVENOUS at 07:13

## 2022-06-07 RX ADMIN — CELECOXIB 200 MG: 100 CAPSULE ORAL at 06:52

## 2022-06-07 RX ADMIN — HYDROMORPHONE HYDROCHLORIDE 0.5 MG: 1 INJECTION, SOLUTION INTRAMUSCULAR; INTRAVENOUS; SUBCUTANEOUS at 09:47

## 2022-06-07 RX ADMIN — TRANEXAMIC ACID 1000 MG: 10 INJECTION, SOLUTION INTRAVENOUS at 07:13

## 2022-06-07 RX ADMIN — PROPOFOL 20 MG: 10 INJECTION, EMULSION INTRAVENOUS at 08:07

## 2022-06-07 RX ADMIN — SUGAMMADEX 200 MG: 100 INJECTION, SOLUTION INTRAVENOUS at 09:24

## 2022-06-07 RX ADMIN — LIDOCAINE HYDROCHLORIDE 30 MG: 20 INJECTION, SOLUTION EPIDURAL; INFILTRATION; INTRACAUDAL; PERINEURAL at 08:04

## 2022-06-07 RX ADMIN — FENTANYL CITRATE 100 MCG: 50 INJECTION, SOLUTION INTRAMUSCULAR; INTRAVENOUS at 08:05

## 2022-06-07 RX ADMIN — CEFAZOLIN SODIUM 2 G: 2 INJECTION, SOLUTION INTRAVENOUS at 08:04

## 2022-06-07 RX ADMIN — DEXAMETHASONE SODIUM PHOSPHATE 4 MG: 4 INJECTION, SOLUTION INTRA-ARTICULAR; INTRALESIONAL; INTRAMUSCULAR; INTRAVENOUS; SOFT TISSUE at 08:08

## 2022-06-07 RX ADMIN — ACETAMINOPHEN 1000 MG: 500 TABLET ORAL at 06:52

## 2022-06-07 RX ADMIN — SODIUM CHLORIDE, POTASSIUM CHLORIDE, SODIUM LACTATE AND CALCIUM CHLORIDE: 600; 310; 30; 20 INJECTION, SOLUTION INTRAVENOUS at 08:49

## 2022-06-07 RX ADMIN — ONDANSETRON 4 MG: 2 INJECTION INTRAMUSCULAR; INTRAVENOUS at 09:24

## 2022-06-07 RX ADMIN — HYDROMORPHONE HYDROCHLORIDE 0.5 MG: 1 INJECTION, SOLUTION INTRAMUSCULAR; INTRAVENOUS; SUBCUTANEOUS at 09:57

## 2022-06-07 RX ADMIN — OXYCODONE HYDROCHLORIDE 5 MG: 5 TABLET ORAL at 09:44

## 2022-06-07 RX ADMIN — Medication 100 MCG: at 08:15

## 2022-06-07 RX ADMIN — KETAMINE HYDROCHLORIDE 10 MG: 50 INJECTION, SOLUTION INTRAMUSCULAR; INTRAVENOUS at 08:04

## 2022-06-07 RX ADMIN — KETAMINE HYDROCHLORIDE 10 MG: 50 INJECTION, SOLUTION INTRAMUSCULAR; INTRAVENOUS at 08:06

## 2022-06-07 RX ADMIN — BUPIVACAINE HYDROCHLORIDE AND EPINEPHRINE BITARTRATE 30 ML: 5; .005 INJECTION, SOLUTION EPIDURAL; INTRACAUDAL; PERINEURAL at 07:29

## 2022-06-07 RX ADMIN — CEFAZOLIN SODIUM 2 G: 2 INJECTION, SOLUTION INTRAVENOUS at 15:32

## 2022-06-07 NOTE — THERAPY EVALUATION
Patient Name: Hunter Guevara  : 1965    MRN: [id number]                              Today's Date: 2022       Admit Date: 2022    Visit Dx:     ICD-10-CM ICD-9-CM   1. Decreased activities of daily living (ADL)  Z78.9 V49.89   2. Primary osteoarthritis of left knee  M17.12 715.16     Patient Active Problem List   Diagnosis   • Primary osteoarthritis of left knee     Past Medical History:   Diagnosis Date   • Diabetes mellitus (HCC)     DOES NOT CHECK BS DAILY   • Gout    • Hyperlipidemia    • Hypertension    • Osteoarthritis     LEFT KNEE     Past Surgical History:   Procedure Laterality Date   • ANKLE SURGERY Right     HARDWARE PLACED    • ANKLE SURGERY Left     AROUND 86 OR 87   • CYST REMOVAL Right     NIPPLE AREA   • HERNIA REPAIR      2020 REPORTS HAD 3 HERNIA REPAIRED   • SHOULDER SURGERY Right    • TONSILLECTOMY        General Information     Row Name 22 1126 22 1119       OT Time and Intention    Document Type therapy note (daily note)  -PG evaluation  -PG    Mode of Treatment -- individual therapy;occupational therapy  -PG    Row Name 22 1119          General Information    Patient Profile Reviewed yes  -PG     Prior Level of Function independent:;ADL's;transfer  -PG     Existing Precautions/Restrictions fall  -PG     Barriers to Rehab none identified  -PG     Row Name 22 1119          Occupational Profile    Reason for Services/Referral (Occupational Profile) Pt is a pleasant 57 yo male admitted for an elective left TKR.  No previous OT services reported.  Pt being evaluated to assess ADL status and facilitate any DC needs.  -PG     Row Name 22 1119          Living Environment    People in Home parent(s);child(maisha), adult  -PG     Row Name 22 1119          Cognition    Orientation Status (Cognition) oriented x 4  -PG     Row Name 22 1119          Safety Issues, Functional Mobility    Safety Issues Affecting Function (Mobility)  ability to follow commands  -     Impairments Affecting Function (Mobility) balance;pain;strength  -PG           User Key  (r) = Recorded By, (t) = Taken By, (c) = Cosigned By    Initials Name Provider Type    PG Amadou Falcon OT Occupational Therapist                 Mobility/ADL's     Row Name 06/07/22 1121          Transfers    Transfers sit-stand transfer  -PG     Sit-Stand Nicholas (Transfers) standby assist  -     Row Name 06/07/22 1121          Sit-Stand Transfer    Assistive Device (Sit-Stand Transfers) walker, front-wheeled  -PG     Row Name 06/07/22 1121          Activities of Daily Living    BADL Assessment/Intervention bathing;upper body dressing;lower body dressing;grooming  -     Row Name 06/07/22 1121          Bathing Assessment/Intervention    Nicholas Level (Bathing) bathing skills;minimum assist (75% patient effort)  -PG     Row Name 06/07/22 1121          Upper Body Dressing Assessment/Training    Nicholas Level (Upper Body Dressing) upper body dressing skills;set up  -[facility] Name 06/07/22 1121          Lower Body Dressing Assessment/Training    Nicholas Level (Lower Body Dressing) lower body dressing skills;minimum assist (75% patient effort)  -PG     Row Name 06/07/22 1121          Grooming Assessment/Training    Nicholas Level (Grooming) grooming skills;set up  -           User Key  (r) = Recorded By, (t) = Taken By, (c) = Cosigned By    Initials Name Provider Type    PG Amadou Falcon OT Occupational Therapist               Obj/Interventions     Row Name 06/07/22 1122          Sensory Assessment (Somatosensory)    Sensory Assessment (Somatosensory) sensation intact  -[facility] Name 06/07/22 1122          Vision Assessment/Intervention    Visual Impairment/Limitations WFL  -     Row Name 06/07/22 1122          Range of Motion Comprehensive    General Range of Motion no range of motion deficits identified  -     Row Name 06/07/22 1122          Strength  Comprehensive (MMT)    General Manual Muscle Testing (MMT) Assessment no strength deficits identified  -PG           User Key  (r) = Recorded By, (t) = Taken By, (c) = Cosigned By    Initials Name Provider Type    PG Amadou Falcon OT Occupational Therapist               Goals/Plan    No documentation.                Clinical Impression     Row Name 06/07/22 1122          Pain Assessment    Pretreatment Pain Rating 0/10 - no pain  -PG     Posttreatment Pain Rating 0/10 - no pain  -PG     Row Name 06/07/22 1122          Plan of Care Review    Plan of Care Reviewed With patient  -PG     Progress no change  -PG     Outcome Evaluation Pt educated in safe transfer technique and home safety in preparatioin for DC today.  No additional OT services are needed at this time.  -PG     Row Name 06/07/22 1122          Therapy Assessment/Plan (OT)    Patient/Family Therapy Goal Statement (OT) Pt wants to ride his motorcycle again  -PG     Criteria for Skilled Therapeutic Interventions Met (OT) no;does not meet criteria for skilled intervention  -PG     Therapy Frequency (OT) evaluation only  -PG     Row Name 06/07/22 1122          Therapy Plan Review/Discharge Plan (OT)    Anticipated Discharge Disposition (OT) home with outpatient therapy services  -PG           User Key  (r) = Recorded By, (t) = Taken By, (c) = Cosigned By    Initials Name Provider Type    PG Amadou Falcon OT Occupational Therapist               Outcome Measures     Row Name 06/07/22 1124          How much help from another is currently needed...    Putting on and taking off regular lower body clothing? 3  -PG     Bathing (including washing, rinsing, and drying) 3  -PG     Toileting (which includes using toilet bed pan or urinal) 4  -PG     Putting on and taking off regular upper body clothing 4  -PG     Taking care of personal grooming (such as brushing teeth) 4  -PG     Eating meals 4  -PG     AM-PAC 6 Clicks Score (OT) 22  -PG     Row Name 06/07/22 1042           How much help from another person do you currently need...    Turning from your back to your side while in flat bed without using bedrails? 3  -CG     Moving from lying on back to sitting on the side of a flat bed without bedrails? 3  -CG     Moving to and from a bed to a chair (including a wheelchair)? 3  -CG     Standing up from a chair using your arms (e.g., wheelchair, bedside chair)? 3  -CG     Climbing 3-5 steps with a railing? 2  -CG     To walk in hospital room? 3  -CG     AM-PAC 6 Clicks Score (PT) 17  -CG     Highest level of mobility 5 --> Static standing  -CG     Row Name 06/07/22 1124          Functional Assessment    Outcome Measure Options AM-PAC 6 Clicks Daily Activity (OT);Optimal Instrument  -PG     Row Name 06/07/22 1124          Optimal Instrument    Optimal Instrument Optimal - 3  -PG     Bending/Stooping 2  -PG     Standing 2  -PG     Reaching 1  -PG     From the list, choose the 3 activities you would most like to be able to do without any difficulty Bending/stooping;Standing;Reaching  -PG     Total Score Optimal - 3 5  -PG           User Key  (r) = Recorded By, (t) = Taken By, (c) = Cosigned By    Initials Name Provider Type    CG Ronn Parsons RN Registered Nurse    Amaduo Qureshi OT Occupational Therapist                Occupational Therapy Education                 Title: PT OT SLP Therapies (Done)     Topic: Occupational Therapy (Done)     Point: ADL training (Done)     Description:   Instruct learner(s) on proper safety adaptation and remediation techniques during self care or transfers.   Instruct in proper use of assistive devices.              Learning Progress Summary           Patient Acceptance, E,D, DU by PG at 6/7/2022 1125                   Point: Home exercise program (Done)     Description:   Instruct learner(s) on appropriate technique for monitoring, assisting and/or progressing therapeutic exercises/activities.              Learning Progress Summary            Patient Acceptance, E,D, DU by PG at 6/7/2022 1125                   Point: Precautions (Done)     Description:   Instruct learner(s) on prescribed precautions during self-care and functional transfers.              Learning Progress Summary           Patient Acceptance, E,D, DU by PG at 6/7/2022 1125                   Point: Body mechanics (Done)     Description:   Instruct learner(s) on proper positioning and spine alignment during self-care, functional mobility activities and/or exercises.              Learning Progress Summary           Patient Acceptance, E,D, DU by PG at 6/7/2022 1125                               User Key     Initials Effective Dates Name Provider Type Discipline    PG 06/16/21 -  Amaduo Falcon OT Occupational Therapist OT              OT Recommendation and Plan  Therapy Frequency (OT): evaluation only  Plan of Care Review  Plan of Care Reviewed With: patient  Progress: no change  Outcome Evaluation: Pt educated in safe transfer technique and home safety in preparatioin for DC today.  No additional OT services are needed at this time.     Time Calculation:    Time Calculation- OT     Row Name 06/07/22 1129             Time Calculation- OT    OT Received On 06/07/22  -PG              Untimed Charges    OT Eval/Re-eval Minutes 30  -PG              Total Minutes    Untimed Charges Total Minutes 30  -PG       Total Minutes 30  -PG            User Key  (r) = Recorded By, (t) = Taken By, (c) = Cosigned By    Initials Name Provider Type    PG Amadou Falcon OT Occupational Therapist              Therapy Charges for Today     Code Description Service Date Service Provider Modifiers Qty    [id number] HC OT EVAL LOW COMPLEXITY 2 6/7/2022 Amadou Falcon OT GO 1               Amadou Falcon OT  6/7/2022

## 2022-06-07 NOTE — NURSING NOTE
Prescriptions delivered to patient's room and discharge teaching given to patient with the dates of when they need to change the dressings.

## 2022-06-07 NOTE — SIGNIFICANT NOTE
06/07/22 1350   Plan   Final Discharge Disposition Code 01 - home or self-care   Final Note Home with outpatient therapy at  PT in [facility]. Appt: 6/9/22 at 4:30pm

## 2022-06-07 NOTE — ANESTHESIA POSTPROCEDURE EVALUATION
Patient: Hunter Guevara    Procedure Summary     Date: 06/07/22 Room / Location: [facility] OR 06 / [facility] MAIN OR    Anesthesia Start: 0802 Anesthesia Stop: 0934    Procedure: TOTAL KNEE ARTHROPLASTY, left (Left Knee) Diagnosis:       Primary osteoarthritis of left knee      (Primary osteoarthritis of left knee [M17.12])    Surgeons: Ricco Ruelas MD Provider: Tc Silva MD    Anesthesia Type: general with block ASA Status: 3          Anesthesia Type: general with block    Vitals  Vitals Value Taken Time   BP 97/71 06/07/22 0948   Temp     Pulse 78 06/07/22 0951   Resp     SpO2 100 % 06/07/22 0951   Vitals shown include unvalidated device data.        Post Anesthesia Care and Evaluation    Patient location during evaluation: bedside  Patient participation: complete - patient participated  Level of consciousness: awake  Pain management: adequate    Airway patency: patent  Anesthetic complications: No anesthetic complications  PONV Status: none  Cardiovascular status: acceptable  Respiratory status: acceptable  Hydration status: acceptable    Comments: An Anesthesiologist personally participated in the most demanding procedures (including induction and emergence if applicable) in the anesthesia plan, monitored the course of anesthesia administration at frequent intervals and remained physically present and available for immediate diagnosis and treatment of emergencies.

## 2022-06-07 NOTE — PLAN OF CARE
Goal Outcome Evaluation:  Plan of Care Reviewed With: patient, mother           Outcome Evaluation: Pt presents with no complaints of pain, and eager to return to home. Pt demonstrates slight balance deficits attributed to L knee replacement. Pt will benefit from skilled physical therapy in order to address deficits. Recommend pt attend outpatient physical therapy upon discharge.

## 2022-06-07 NOTE — ANESTHESIA PREPROCEDURE EVALUATION
Anesthesia Evaluation     Patient summary reviewed and Nursing notes reviewed                Airway   Mallampati: I  TM distance: >3 FB  Neck ROM: full  No difficulty expected  Dental    (+) lower dentures and upper dentures    Pulmonary - negative pulmonary ROS and normal exam    breath sounds clear to auscultation  Cardiovascular - normal exam    Rhythm: regular  Rate: normal    (+) hypertension, hyperlipidemia,       Neuro/Psych- negative ROS  GI/Hepatic/Renal/Endo    (+)   diabetes mellitus,     Musculoskeletal     Abdominal    Substance History   (+) drug use     OB/GYN negative ob/gyn ROS         Other   arthritis,        Other Comment: Daily marijuana use. Daily alcohol.                Anesthesia Plan    ASA 3     general with block     intravenous induction     Anesthetic plan, all risks, benefits, and alternatives have been provided, discussed and informed consent has been obtained with: patient.        CODE STATUS:

## 2022-06-07 NOTE — PLAN OF CARE
Goal Outcome Evaluation:  Plan of Care Reviewed With: patient        Progress: no change  Outcome Evaluation: Pt educated in safe transfer technique and home safety in preparatioin for DC today.  No additional OT services are needed at this time.

## 2022-06-07 NOTE — ANESTHESIA PROCEDURE NOTES
Peripheral Block      Patient reassessed immediately prior to procedure    Patient location during procedure: pre-op  Start time: 6/7/2022 7:24 AM  Stop time: 6/7/2022 7:27 AM  Reason for block: at surgeon's request and post-op pain management  Performed by  Anesthesiologist: Demetrius Shipley MD  Preanesthetic Checklist  Completed: patient identified, IV checked, site marked, risks and benefits discussed, surgical consent, monitors and equipment checked, pre-op evaluation and timeout performed  Prep:  Pt Position: supine  Sterile barriers:alcohol skin prep, partial drape, cap, washed/disinfected hands, mask and gloves  Prep: ChloraPrep  Patient monitoring: blood pressure monitoring, continuous pulse oximetry and EKG  Procedure    Sedation: yes  Performed under: local infiltration  Guidance:ultrasound guided    ULTRASOUND INTERPRETATION. Using ultrasound guidance a 20 G gauge needle was placed in close proximity to the nerve, at which point, under ultrasound guidance anesthetic was injected in the area of the nerve and spread of the anesthesia was seen on ultrasound in close proximity thereto.  There were no abnormalities seen on ultrasound; a digital image was taken; and the patient tolerated the procedure with no complications. Images:still images obtained, printed/placed on chart    Laterality:left  Block Type:adductor canal block  Injection Technique:single-shot  Needle Type:echogenic  Needle Gauge:20 G (4in)  Resistance on Injection: none    Medications Used: bupivacaine-EPINEPHrine PF (MARCAINE w/EPI) 0.5% -[id number] injection, 30 mL      Post Assessment  Injection Assessment: negative aspiration for heme, no paresthesia on injection and incremental injection  Patient Tolerance:comfortable throughout block  Complications:no  Additional Notes  The block or continuous infusion is requested by the referring physician for management of postoperative pain, or pain related to a procedure. Ultrasound guidance (deemed  medically necessary). Painless injection, pt was awake and conversant during the procedure without complications. Needle and surrounding structures visualized throughout procedure. No adverse reactions or complications seen during this period. Post-procedure image showed no signs of complication, and anatomy was consistent with an uncomplicated nerve blockade.

## 2022-06-07 NOTE — H&P
"h and p      Chief Complaint  Initial Evaluation of the Left Knee        Subjective          Hunter Guevara presents to [facility] for an evaluation of left knee. Patient has pain and swelling in the left knee. Patient has difficulty with steps and prolonged ambulation. He does limp due to the pain. He has difficulty with performing ADLs.           Allergies   Allergen Reactions   • Penicillins Hives         Social History   Social History            Socioeconomic History   • Marital status:    Tobacco Use   • Smoking status: Current Every Day Smoker       Types: Cigars   • Smokeless tobacco: Former User            Review of Systems            Objective      Vital Signs:   Pulse 78   Ht 170.2 cm (67\")   Wt 81.6 kg (180 lb)   SpO2 97%   BMI 28.19 kg/m²        Physical Exam  Constitutional:       Appearance: Normal appearance. Patient is well-developed and normal weight.   HENT:      Head: Normocephalic.      Right Ear: Hearing and external ear normal.      Left Ear: Hearing and external ear normal.      Nose: Nose normal.   Eyes:      Conjunctiva/sclera: Conjunctivae normal.   Cardiovascular:      Rate and Rhythm: Normal rate.   Pulmonary:      Effort: Pulmonary effort is normal.      Breath sounds: No wheezing or rales.   Abdominal:      Palpations: Abdomen is soft.      Tenderness: There is no abdominal tenderness.   Musculoskeletal:      Cervical back: Normal range of motion.   Skin:     Findings: No rash.   Neurological:      Mental Status: Patient  is alert and oriented to person, place, and time.   Psychiatric:         Mood and Affect: Mood and affect normal.         Judgment: Judgment normal.         Ortho Exam       LEFT KNEE: Calf supple, non-tender, no signs of DVT. Dorsal Pedal Pulse 2+, posterior tibialis pulse 2+. Good strength to hamstrings, quadriceps, dorsiflexors and plantar flexors. Stable to varus/valgus stress. Stable anterior and posterior drawer. " Tender medial and lateral joint line. Varus alignment. Full extension. Flexion to 115 degrees.         Procedures                    Imaging Results (Most Recent)      Procedure Component Value Units Date/Time     XR Knee 3 View Left [[id number]] Resulted: 03/28/22 1405       Updated: 03/28/22 1408                   Result Review    :      X-Ray Report:  Left knee(s) X-Ray  Indication: Evaluation of left knee pain   AP, Lateral and Standing view(s)  Findings: Severe osteoarthritis of the left knee. No fracture or dislocation.   Prior studies available for comparison: no      Assessment and Plan      DX: left knee osteoarthritis      Discussed treatment plans and diagnosis with the patient. He is a candidate for a left total knee replacement. Patient wishes to proceed with a left total knee replacement.      Discussed surgery., Risks/benefits discussed with patient including, but not limited to: infection, bleeding, neurovascular damage, malunion, nonunion, aesthetic deformity, need for further surgery, and death., Discussed with patient the implant type being used during surgery and patient understands and desires to proceed. and Surgery pamphlet given.     Follow Up      Post-operatively.         Ricco Ruelas MD  06/07/22

## 2022-06-07 NOTE — THERAPY EVALUATION
Acute Care - Physical Therapy Initial Evaluation   [place]     Patient Name: Hunter Guevara  : 1965  MRN: [id number]  Today's Date: 2022   Admit date: 2022     Referring Physician: Ricco Ruelas MD     Surgery Date:2022   Procedure(s) (LRB):  TOTAL KNEE ARTHROPLASTY, left (Left)        Visit Dx:     ICD-10-CM ICD-9-CM   1. Decreased activities of daily living (ADL)  Z78.9 V49.89   2. Primary osteoarthritis of left knee  M17.12 715.16   3. Difficulty in walking  R26.2 719.7     Patient Active Problem List   Diagnosis   • Primary osteoarthritis of left knee     Past Medical History:   Diagnosis Date   • Diabetes mellitus (HCC)     DOES NOT CHECK BS DAILY   • Gout    • Hyperlipidemia    • Hypertension    • Osteoarthritis     LEFT KNEE     Past Surgical History:   Procedure Laterality Date   • ANKLE SURGERY Right     HARDWARE PLACED    • ANKLE SURGERY Left     AROUND 86 OR 87   • CYST REMOVAL Right     NIPPLE AREA   • HERNIA REPAIR      2020 REPORTS HAD 3 HERNIA REPAIRED   • SHOULDER SURGERY Right    • TONSILLECTOMY       PT Assessment (last 12 hours)     PT Evaluation and Treatment     Row Name 22 1152          Physical Therapy Time and Intention    Subjective Information no complaints  -PARESH     Document Type evaluation  -PARESH     Mode of Treatment individual therapy;physical therapy  -PARESH     Patient Effort excellent  -PARESH     Symptoms Noted During/After Treatment none  -PARESH     Row Name 22 1152          General Information    Patient Profile Reviewed yes  -PARESH     Patient Observations alert;cooperative;agree to therapy  -PARESH     Prior Level of Function independent:;all household mobility;community mobility  -PARESH     Equipment Currently Used at Home none  -PARESH     Existing Precautions/Restrictions weight bearing  -PARESH     Barriers to Rehab none identified  -PARESH     Row Name 22 1152          Living Environment    Current Living Arrangements home  -PARESH     People in Home  child(maisha), adult;parent(s)  -PARESH     Primary Care Provided by self  -PARESH     Row Name 06/07/22 1152          Home Use of Assistive/Adaptive Equipment    Equipment Currently Used at Home none  -PARESH     [facility] Name 06/07/22 1152          Range of Motion (ROM)    Range of Motion ROM is WFL  -PARESH     [facility] Name 06/07/22 1152          Strength (Manual Muscle Testing)    Strength (Manual Muscle Testing) strength is [facility] Name 06/07/22 1152          Mobility    Extremity Weight-bearing Status left lower extremity  -PARESH     Left Lower Extremity (Weight-bearing Status) weight-bearing as tolerated (WBAT)  -PARESH     [facility] Name 06/07/22 1152          Transfers    Transfers sit-stand transfer;car transfer  -PARESH     Sit-Stand [place] (Transfers) standby assist  -PARESH     [facility] Name 06/07/22 1152          Sit-Stand Transfer    Assistive Device (Sit-Stand Transfers) walker, front-wheeled  -PARESH     [facility] Name 06/07/22 1152          Car Transfer    Type (Car Transfer) sit-stand  -PARESH     [place] Level (Car Transfer) standby assist  -PARESH     Assistive Device (Car Transfer) walker, front-wheeled  -PARESH     Row Name 06/07/22 1152          Gait/Stairs (Locomotion)    Gait/Stairs Locomotion gait/ambulation assistive device  -PARESH     [place] Level (Gait) contact guard;standby assist  -PARESH     Assistive Device (Gait) walker, front-wheeled  -PARESH     Ambulated day of surgery or within 4 hours of PACU discharge yes  -PARESH     Distance in Feet (Gait) 200  -PARESH     Pattern (Gait) step-through  -PARESH     [facility] Name 06/07/22 1152          Safety Issues, Functional Mobility    Impairments Affecting Function (Mobility) balance  -PARESH     [facility] Name 06/07/22 1152          Balance    Balance Assessment standing dynamic balance  -PARESH     Dynamic Standing Balance standby assist  -PARESH     Position/Device Used, Standing Balance walker, front-wheeled  -PARESH     Row Name             Wound 06/07/22 0835 Left anterior knee Incision    Wound - Properties Group Placement Date:  06/07/22  -LB Placement Time: 0835 -LB Side: Left  -LB Orientation: anterior  -LB Location: knee  -LB Primary Wound Type: Incision  -LB     Retired Wound - Properties Group Placement Date: 06/07/22  -LB Placement Time: 0835  -LB Side: Left  -LB Orientation: anterior  -LB Location: knee  -LB Primary Wound Type: Incision  -LB     Retired Wound - Properties Group Date first assessed: 06/07/22  -LB Time first assessed: 0835  -LB Side: Left  -LB Location: knee  -LB Primary Wound Type: Incision  -LB     Row Name 06/07/22 1152          Plan of Care Review    Plan of Care Reviewed With patient;mother  -PARESH     Outcome Evaluation Pt presents with no complaints of pain, and eager to return to home. Pt demonstrates slight balance deficits attributed to L knee replacement. Pt will benefit from skilled physical therapy in order to address deficits. Recommend pt attend outpatient physical therapy upon discharge.  -PARESH     Row Name 06/07/22 1152          Positioning and Restraints    Pre-Treatment Position sitting in chair/recliner  -PARESH     Post Treatment Position chair  -PARESH     Row Name 06/07/22 1152          Therapy Assessment/Plan (PT)    Patient/Family Therapy Goals Statement (PT) Return home and ride his motorcycle  -PARESH     Rehab Potential (PT) good, to achieve stated therapy goals  -PARESH     Criteria for Skilled Interventions Met (PT) yes;meets criteria;skilled treatment is necessary  -PARESH     Therapy Frequency (PT) 2 times/day  -PARESH     Predicted Duration of Therapy Intervention (PT) 10 days  -PARESH     Problem List (PT) problems related to;balance;coordination  -PARESH     Activity Limitations Related to Problem List (PT) unable to ambulate safely  -PARESH     Row Name 06/07/22 1152          Therapy Plan Review/Discharge Plan (PT)    Therapy Plan Review (PT) evaluation/treatment results reviewed;participants voiced agreement with care plan;patient;mother  -PARESH     Row Name 06/07/22 1152          Physical Therapy Goals    Gait Training Goal  Selection (PT) gait training, PT goal 1  -PARESH     Balance Goal Selection (PT) balance, PT goal 1  -PARESH     Row Name 06/07/22 1152          Gait Training Goal 1 (PT)    Activity/Assistive Device (Gait Training Goal 1, PT) gait (walking locomotion);assistive device use  -PARESH     [place] Level (Gait Training Goal 1, PT) modified independence  -PARESH     Distance (Gait Training Goal 1, PT) 400  -PARESH     Time Frame (Gait Training Goal 1, PT) long term goal (LTG);10 days  -PARESH     Row Name 06/07/22 1152          Balance Goal 1 (PT)    Activity/Assistive Device (Balance Goal 1, PT) standing, dynamic;walker, rolling  -PARESH     [place] Level/Cues Needed (Balance Goal 1, PT) conditional independence  -PARESH     Time Frame (Balance Goal 1, PT) long term goal (LTG);10 days  -PARESH           User Key  (r) = Recorded By, (t) = Taken By, (c) = Cosigned By    Initials Name Provider Type    Brendan Barnard RN Registered Nurse    Emile Davis, PT Physical Therapist                Physical Therapy Education                 Title: PT OT SLP Therapies (Done)     Topic: Physical Therapy (Done)     Point: Mobility training (Done)     Learning Progress Summary           Patient EagDALLIN saleh, VU by PARESH at 6/7/2022 1201                   Point: Precautions (Done)     Learning Progress Summary           Patient Kwasi, E, VU by PARESH at 6/7/2022 1201                               User Key     Initials Effective Dates Name Provider Type Discipline    PARESH 06/03/21 -  Emile Eng PT Physical Therapist PT              PT Recommendation and Plan  Anticipated Discharge Disposition (PT): home with outpatient therapy services  Planned Therapy Interventions (PT): balance training, gait training  Therapy Frequency (PT): 2 times/day  Plan of Care Reviewed With: patient, mother  Outcome Evaluation: Pt presents with no complaints of pain, and eager to return to home. Pt demonstrates slight balance deficits attributed to L knee replacement. Pt will  benefit from skilled physical therapy in order to address deficits. Recommend pt attend outpatient physical therapy upon discharge.   Outcome Measures     Row Name 06/07/22 1200             How much help from another person do you currently need...    Turning from your back to your side while in flat bed without using bedrails? 4  -PARESH      Moving from lying on back to sitting on the side of a flat bed without bedrails? 3  -PARESH      Moving to and from a bed to a chair (including a wheelchair)? 4  -PARESH      Standing up from a chair using your arms (e.g., wheelchair, bedside chair)? 4  -PARESH      Climbing 3-5 steps with a railing? 3  -PARESH      To walk in hospital room? 3  -PARESH      AM-PAC 6 Clicks Score (PT) 21  -PARESH              Functional Assessment    Outcome Measure Options AM-PAC 6 Clicks Basic Mobility (PT)  -PARESH            User Key  (r) = Recorded By, (t) = Taken By, (c) = Cosigned By    Initials Name Provider Type    Emile Davis PT Physical Therapist                 Time Calculation:    PT Charges     Row Name 06/07/22 1152             Untimed Charges    PT Eval/Re-eval Minutes 36  -PARESH              Total Minutes    Untimed Charges Total Minutes 36  -PARESH       Total Minutes 36  -PARESH            User Key  (r) = Recorded By, (t) = Taken By, (c) = Cosigned By    Initials Name Provider Type    Emile Davis PT Physical Therapist              Therapy Charges for Today     Code Description Service Date Service Provider Modifiers Qty    54350375715 HC PT EVAL LOW COMPLEXITY 3 6/7/2022 Emile Eng, PT GP 1          PT G-Codes  Outcome Measure Options: AM-PAC 6 Clicks Basic Mobility (PT)  AM-PAC 6 Clicks Score (PT): 21  AM-PAC 6 Clicks Score (OT): 22    Emile Eng PT  6/7/2022

## 2022-06-08 NOTE — OP NOTE
TOTAL KNEE ARTHROPLASTY WITH WHITNEY NAVIGATION  Procedure Report    Patient Name:  Hunter Guevara  YOB: 1965    Date of Surgery:  6/7/2022       Pre-op Diagnosis:   Primary osteoarthritis of left knee [M17.12]       Post-Op Diagnosis Codes:     * Primary osteoarthritis of left knee [M17.12]    Procedure/CPT® Codes:      Procedure(s):  TOTAL KNEE ARTHROPLASTY, left with computer navigation    Staff:  Surgeon(s):  Ricco Ruelas MD    Assistant: Dwight Hills    Anesthesia: General    Estimated Blood Loss: 50ml    Implants:    Implant Name Type Inv. Item Serial No.  Lot No. LRB No. Used Action   CMT BONE PALACOS R HI/VISC 1X40 - [id number] Implant CMT BONE PALACOS R HI/VISC 1X40  [facility] [id number] Left 1 Implanted   PAT PERSONA ALLPOLY CMT 8.5X32MM - [id number] Implant PAT PERSONA ALLPOLY CMT 8.5X32MM  [employer] [id number] Left 1 Implanted   ART/SRF KN PERSONA/VE PS EF 8TO11 10MM LT - [id number] Implant ART/SRF KN PERSONA/VE PS EF 8TO11 10MM LT  [employer] [id number] Left 1 Implanted   STEM TIB PERSONA CMT 5D SZF LT - [id number] Implant STEM TIB PERSONA CMT 5D SZF LT  [employer] [id number] Left 1 Implanted   COMP FEM/KN PERSONA CR CMT COCR STD SZ9 LT - [id number] Implant COMP FEM/KN PERSONA CR CMT COCR STD SZ9 LT  [employer] [id number] Left 1 Implanted       Specimen:          None      Complications: None    Description of Procedure: See H&P for risks and benefits.The patient was taken to the operating room and placed supine on the operating table after adductor canal block was done in preoperative holding. After general endotracheal anesthesia was established, the left knee was examined.  The patient had full range of motion and no instability.  The left lower extremity was prepped and draped in the standard usual fashion using alcohol and ChloraPrep.  A standard incision was made one to two fingerbreadths superior to the superior pole of the patella down to the medial  aspect of the tibial tubercle with a knife.  Dissection was carried down raising full thickness skin flaps laterally and medially.  A medial rectus parapatellar approach was undertaken for a.  Appropriate soft tissue release was done in a standard fashion with a knife and a curved osteotome.  The patient had significant signs of osteoarthritis and the knee was brought up into flexion.  The collateral ligaments were protected with retractors and the tracking device for the computer navigation software was placed in the distal femur and distal femoral points were mapped out according to the computer navigation software and the distal femoral cut was made.  The distal femoral cut was in 0 degrees of varus and valgus and 2 degrees of flexion.  It was accepted.  The posterior condylar referencing guide was then pinned.  The femur was sized.  The cutting block was placed in the distal femur and all of the femoral cuts were made, anterior posterior, anterior chamfer, and posterior chamfer cuts.  The bone, from the cuts, was removed.  The PCL retractor was placed and attention was focused on the tibia.  The tracking device was mounted on the medial tibial plateau in standard fashion.  All the femoral points were mapped out according to the computer navigation software and the proximal tibial cut was made.  The resulting cut was in 0 degrees of varus and valgus with a 5 degree posterior slope.  It was accepted.  The posterior condylar spurs were removed, along with the medial and lateral meniscus.  Trials were placed and the extramedullary guide and the trials were pointing to the base of the second metatarsal in the center of the ankle.  The patella was calipered and cut to accommodate a medialized patellar button trial.  The knee was taken through range of motion, had equal flexion-extension gaps, was stable to varus and valgus stress and had full passive motion.  The trials were removed and the tibia was punched in the  standard fashion and the bone ends were copiously irrigated with Bacitracin Simpulse irrigation as the cement was mixed. The tibial implant was cemented in position, followed by the size the femur.  They were malleted and cemented in position and the trial poly was placed.  The knee was held in extension and the patella was cemented into place and held with a clamp.  The knee was held in extension until the cement had hardened.  Excess cement was then removed from the implant edges.  Trials were undertaken and the correct polyethylene insert was chosen and was implanted.  The knee had the same range of motion and stability as the trials.  The wound was copiously irrigated with Bacitracin Simpulse irrigation and the arthrotomy was closed with Ethibond.  More copious irrigation followed and the deep fat was closed with 0 Vicryl.  The subcutaneous tissue was closed with 2-0 Vicryl and the skin was closed with staples.  The closure was done in 45 degrees of knee flexion.  The incision was washed and dried and Aquacel dressing and long LIBERTY hose was applied to the lower extremity. The patient tolerated the procedure well, was extubated, and taken to the recovery room.       Ricco Ruelas MD     Date: 6/8/2022  Time: 07:18 EDT

## 2022-06-09 ENCOUNTER — TREATMENT (OUTPATIENT)
Dept: PHYSICAL THERAPY | Facility: CLINIC | Age: 57
End: 2022-06-09

## 2022-06-09 DIAGNOSIS — M25.662 KNEE STIFFNESS, LEFT: ICD-10-CM

## 2022-06-09 DIAGNOSIS — R26.9 GAIT DISTURBANCE: ICD-10-CM

## 2022-06-09 DIAGNOSIS — Z96.652 S/P TKR (TOTAL KNEE REPLACEMENT), LEFT: Primary | ICD-10-CM

## 2022-06-09 DIAGNOSIS — M25.562 LEFT KNEE PAIN, UNSPECIFIED CHRONICITY: ICD-10-CM

## 2022-06-09 PROCEDURE — 97161 PT EVAL LOW COMPLEX 20 MIN: CPT | Performed by: PHYSICAL THERAPIST

## 2022-06-09 NOTE — PROGRESS NOTES
Physical Therapy Initial Evaluation and Plan of Care    Patient: Hunter Guevara   : 1965  Diagnosis/ICD-10 Code:  S/P TKR (total knee replacement), left [Z96.652]  Referring practitioner: Ricco Ruelas MD  Date of Initial Visit: 2022  Today's Date: 2022  Patient seen for 1 sessions           Subjective Questionnaire: LEFS:  = 76% limitation      Subjective Evaluation    History of Present Illness  Mechanism of injury: Pt presents to therapy s/p Left TKA 22, ambulating with FWW with incision covered with bandage and using LIBERTY hose bilaterally. He reports having no stairs where he is staying currently, but does have a flight at his home. He does a lot around his home and helps his mother around her house, he is a  and will need to be squatting, kneeling, etc.    PMH: diabetes    Pain  Current pain ratin  At best pain ratin  At worst pain ratin  Quality: dull ache and tight  Aggravating factors: repetitive movement, ambulation and standing    Treatments  Discharged from (in last 30 days): inpatient hospitalization  Patient Goals  Patient goals for therapy: decreased edema, decreased pain, improved balance, increased motion, increased strength, independence with ADLs/IADLs and return to work             Objective          Observations   Left Knee   Positive for edema and incision.     Additional Knee Observation Details  Light redness surrounding the anterior knee, generalized edema surrounding the knee and distally    Neurological Testing     Sensation     Knee   Left Knee   Intact: light touch    Right Knee   Intact: light touch     Comments   Left light touch: decreased sensation surrounding the knee as expected.     Active Range of Motion   Left Knee   Flexion: 90 degrees   Extension: Left knee active extension: -7.     Right Knee   Flexion: 136 degrees   Extension: 0 degrees     Passive Range of Motion   Left Knee   Flexion: 101 degrees   Extension: Left knee  passive extension: -2.     Strength/Myotome Testing     Left Knee   Flexion: 3-  Extension: 3-  Quadriceps contraction: fair    Right Knee   Normal strength    Ambulation     Comments   Ambulates with a stiff knee using FWW, first few steps follow a step to pattern, then he walks reciprocally with decreased knee extension      See Exercise, Manual, and Modality Logs for complete treatment.     Assessment & Plan     Assessment  Impairments: abnormal gait, abnormal muscle firing, abnormal or restricted ROM, activity intolerance, impaired balance, impaired physical strength, pain with function and weight-bearing intolerance  Functional Limitations: walking, standing and stooping  Assessment details: The patient presents to physical therapy s/p Left TKA 6/7/22. The patient presents with associated knee weakness, stiffness, antalgic gait, and functional deficits (LEFS). The patient would benefit from skilled PT intervention to address the above mentioned functional limitations.     Prognosis: good    Goals  Plan Goals: KNEE PROBLEMS    1. The patient has limited ROM of the left knee.   LTG 1:12 weeks:  The patient will demonstrate 0 to 120 degrees of ROM for the left knee in order to allow patient to ambulate.    STATUS:  New   STG 1a: 6 weeks:  The patient will demonstrate 5 to 105 degrees of ROM for the left knee.    STATUS:  New   TREATMENT: Manual therapy, therapeutic exercise, home exercise instruction, and modalities as needed to include:  moist heat, electrical stimulation, ultrasound, and ice.    2. The patient has limited strength of the left knee.   LTG 2: 12 weeks: The patient will demonstrate 5 /5 strength for knee flexion and extension in order to allow patient improved joint stability    STATUS:  New   STG 2a: 6 weeks: The patient will demonstrate 4 /5 strength for knee flexion and extension    STATUS:  New    TREATMENT: Manual therapy, therapeutic exercise, home exercise instruction, aquatic therapy, and  modalities as needed to include:  moist heat, electrical stimulation, ultrasound, and ice.     3. The patient has gait dysfunction.   LTG 3: 12 weeks:  The patient will ambulate without assistive device, independently, for community distances with normal biomechanics of the left lower extremity in order to improve mobility and allow patient to perform activities such as grocery shopping with greater ease.    STATUS:  New   STG 3a: 4 weeks: The patient will ambulate with a single tipped cane with appropriate gait mechanics.    STATUS:  New   TREATMENT: Gait training, aquatic therapy, therapeutic exercise, and home exercise instruction.    4. Mobility: Walking/Moving Around Functional Limitation     LTG 4: 12 weeks:  The patient will demonstrate 25 % limitation by achieving a score of 60/80 on the LEFS.    STATUS:  New   STG 4 a: 6 weeks:  The patient will demonstrate 50 % limitation by achieving a score of 40/80 on the LEFS.      STATUS:  New   TREATMENT:  Manual therapy, therapeutic exercise, home exercise instruction, and modalities as needed to include: moist heat, electrical stimulation, and ultrasound.       Plan  Therapy options: will be seen for skilled therapy services  Planned modality interventions: TENS, cryotherapy and thermotherapy (hydrocollator packs)  Planned therapy interventions: functional ROM exercises, gait training, home exercise program, manual therapy, strengthening, stretching, therapeutic activities, soft tissue mobilization, joint mobilization, neuromuscular re-education, balance/weight-bearing training and transfer training  Other planned therapy interventions: aquatic therapy  Frequency: 3x week  Duration in weeks: 12  Treatment plan discussed with: patient        Visit Diagnoses:    ICD-10-CM ICD-9-CM   1. S/P TKR (total knee replacement), left  Z96.652 V43.65   2. Left knee pain, unspecified chronicity  M25.562 719.46   3. Knee stiffness, left  M25.662 719.56   4. Gait disturbance   R26.9 781.2       History # of Personal Factors and/or Comorbidities: MODERATE (1-2)  Examination of Body System(s): # of elements: MODERATE (3)  Clinical Presentation: STABLE   Clinical Decision Making: LOW       Timed:         Manual Therapy:    0     mins  75103;     Therapeutic Exercise:    15     mins  02411; (Not billed due to insurance at [facility])     Neuromuscular Marty:    0    mins  36335;    Therapeutic Activity:     0     mins  72615;     Gait Trainin     mins  90729;     Ultrasound:     0     mins  07105;    Ionto                               0    mins   18225  Self Care                       0     mins   75191  Canalith Repos    0     mins 28839      Un-Timed:  Electrical Stimulation:    0     mins  76338 ( );  Dry Needling     0     mins self-pay  Traction     0     mins 01473  Low Eval     20     Mins  79287  Mod Eval     0     Mins  47353  High Eval                       0     Mins  88571  Re-Eval                           0    mins  34745    Timed Treatment:   0   mins   Total Treatment:     35   mins    PT SIGNATURE: Chacho Mitchell PT     Electronically signed 2022    [place] License: PT - [id number]     Initial Certification  Certification Period: 2022 thru 2022  I certify that the therapy services are furnished while this patient is under my care.  The services outlined above are required by this patient, and will be reviewed every 90 days.     PHYSICIAN: Ricco Ruelas MD   NPI: [id number]                                        DATE:     Please sign and return via fax to [phone]. Thank you, [facility].

## 2022-06-10 ENCOUNTER — TELEPHONE (OUTPATIENT)
Dept: ORTHOPEDIC SURGERY | Facility: CLINIC | Age: 57
End: 2022-06-10

## 2022-06-10 DIAGNOSIS — M17.12 PRIMARY OSTEOARTHRITIS OF LEFT KNEE: ICD-10-CM

## 2022-06-10 RX ORDER — OXYCODONE AND ACETAMINOPHEN 7.5; 325 MG/1; MG/1
1 TABLET ORAL EVERY 4 HOURS PRN
Qty: 42 TABLET | Refills: 0 | Status: SHIPPED | OUTPATIENT
Start: 2022-06-10 | End: 2022-06-20 | Stop reason: SDUPTHER

## 2022-06-13 ENCOUNTER — TREATMENT (OUTPATIENT)
Dept: PHYSICAL THERAPY | Facility: CLINIC | Age: 57
End: 2022-06-13

## 2022-06-13 DIAGNOSIS — M25.562 LEFT KNEE PAIN, UNSPECIFIED CHRONICITY: ICD-10-CM

## 2022-06-13 DIAGNOSIS — Z96.652 S/P TKR (TOTAL KNEE REPLACEMENT), LEFT: Primary | ICD-10-CM

## 2022-06-13 DIAGNOSIS — R26.9 GAIT DISTURBANCE: ICD-10-CM

## 2022-06-13 DIAGNOSIS — M25.662 KNEE STIFFNESS, LEFT: ICD-10-CM

## 2022-06-13 PROCEDURE — 97110 THERAPEUTIC EXERCISES: CPT | Performed by: PHYSICAL THERAPIST

## 2022-06-13 PROCEDURE — 97140 MANUAL THERAPY 1/> REGIONS: CPT | Performed by: PHYSICAL THERAPIST

## 2022-06-13 NOTE — PROGRESS NOTES
Physical Therapy Daily Progress Note    Patient: Hunter Guevara   : 1965  Diagnosis/ICD-10 Code:  S/P TKR (total knee replacement), left [Z96.652]  Referring practitioner: Ricco Ruelas MD  Date of Initial Visit: Type: THERAPY  Noted: 2022  Today's Date: 2022  Patient seen for 2 sessions           Subjective Evaluation    History of Present Illness  Mechanism of injury: Pt reporting he feels like his swelling is doing much better. He has changed his bandage and its doing well.     Pain  Current pain ratin           Objective   See Exercise, Manual, and Modality Logs for complete treatment.       Assessment & Plan     Assessment    Assessment details: Pt tolerated today's session well, his motion is steadily improving achieving passively 0 degrees extension and 105 degrees flexion. Pt would benefit from continued skilled therapy to address deficits. Progress per plan of care.                 Manual Therapy:    8     mins  87021;  Therapeutic Exercise:    20     mins  62488;     Neuromuscular Mraty:    0    mins  49473;    Therapeutic Activity:     0     mins  68759;     Gait Trainin     mins  17519;     Ultrasound:     0     mins  23990;    Electrical Stimulation:    0     mins  34919 ( );  Dry Needling     0     mins self-pay;  Aquatic Therapy    0     mins  07400;  Mechanical Traction    0     mins  79498  Moist Heat     0     mins  No charge    Timed Treatment:   28   mins   Total Treatment:     28   mins    Chacho Mitchell PT  Physical Therapist    Electronically signed 2022    [place] License: PT - [id number]

## 2022-06-15 ENCOUNTER — TREATMENT (OUTPATIENT)
Dept: PHYSICAL THERAPY | Facility: CLINIC | Age: 57
End: 2022-06-15

## 2022-06-15 DIAGNOSIS — Z96.652 S/P TKR (TOTAL KNEE REPLACEMENT), LEFT: Primary | ICD-10-CM

## 2022-06-15 DIAGNOSIS — M25.562 LEFT KNEE PAIN, UNSPECIFIED CHRONICITY: ICD-10-CM

## 2022-06-15 DIAGNOSIS — M25.662 KNEE STIFFNESS, LEFT: ICD-10-CM

## 2022-06-15 DIAGNOSIS — R26.9 GAIT DISTURBANCE: ICD-10-CM

## 2022-06-15 PROCEDURE — 97110 THERAPEUTIC EXERCISES: CPT | Performed by: PHYSICAL THERAPIST

## 2022-06-15 PROCEDURE — 97140 MANUAL THERAPY 1/> REGIONS: CPT | Performed by: PHYSICAL THERAPIST

## 2022-06-15 NOTE — PROGRESS NOTES
Physical Therapy Daily Progress Note    Patient: Hunter Guevara   : 1965  Diagnosis/ICD-10 Code:  S/P TKR (total knee replacement), left [Z96.652]  Referring practitioner: Ricco Ruelas MD  Date of Initial Visit: Type: THERAPY  Noted: 2022  Today's Date: 6/15/2022  Patient seen for 3 sessions           Subjective Evaluation    History of Present Illness  Mechanism of injury: Pt reporting he got very hot outside yesterday, spiked a fever, but it went down with medication and rest. Discussed this with ortho nursing as well.     Pain  Current pain ratin           Objective   See Exercise, Manual, and Modality Logs for complete treatment.       Assessment & Plan     Assessment    Assessment details: Pt tolerated today's session well, he improves to 107 degrees passive flexion today. Also about 3 degrees shy of full active extension. Pt would benefit from continued skilled therapy to address deficits. Progress per plan of care.                 Manual Therapy:    8     mins  12310;  Therapeutic Exercise:    20     mins  35706;     Neuromuscular Marty:    0    mins  18536;    Therapeutic Activity:     0     mins  94799;     Gait Trainin     mins  89500;     Ultrasound:     0     mins  63612;    Electrical Stimulation:    0     mins  75699 ( );  Dry Needling     0     mins self-pay;  Aquatic Therapy    0     mins  31092;  Mechanical Traction    0     mins  55120  Moist Heat     0     mins  No charge    Timed Treatment:   28/   mins   Total Treatment:     28   mins    Chacho Mitchell PT  Physical Therapist    Electronically signed 6/15/2022    [place] License: PT - [id number]

## 2022-06-20 ENCOUNTER — OFFICE VISIT (OUTPATIENT)
Dept: ORTHOPEDIC SURGERY | Facility: CLINIC | Age: 57
End: 2022-06-20

## 2022-06-20 VITALS — OXYGEN SATURATION: 96 % | WEIGHT: 173.6 LBS | HEIGHT: 66 IN | HEART RATE: 92 BPM | BODY MASS INDEX: 27.9 KG/M2

## 2022-06-20 DIAGNOSIS — M25.562 LEFT KNEE PAIN, UNSPECIFIED CHRONICITY: Primary | ICD-10-CM

## 2022-06-20 DIAGNOSIS — Z96.652 AFTERCARE FOLLOWING LEFT KNEE JOINT REPLACEMENT SURGERY: ICD-10-CM

## 2022-06-20 DIAGNOSIS — Z47.1 AFTERCARE FOLLOWING LEFT KNEE JOINT REPLACEMENT SURGERY: ICD-10-CM

## 2022-06-20 DIAGNOSIS — M17.12 PRIMARY OSTEOARTHRITIS OF LEFT KNEE: ICD-10-CM

## 2022-06-20 PROCEDURE — 99024 POSTOP FOLLOW-UP VISIT: CPT | Performed by: PHYSICIAN ASSISTANT

## 2022-06-20 RX ORDER — OXYCODONE AND ACETAMINOPHEN 7.5; 325 MG/1; MG/1
1 TABLET ORAL EVERY 4 HOURS PRN
Qty: 42 TABLET | Refills: 0 | Status: SHIPPED | OUTPATIENT
Start: 2022-06-20 | End: 2022-07-05 | Stop reason: SDUPTHER

## 2022-06-20 NOTE — TELEPHONE ENCOUNTER
Patient was seen today for 2 week postop appointment and is requesting a refill on pain medicine.

## 2022-06-20 NOTE — PATIENT INSTRUCTIONS
Staples removed - Keep the incision clean and dry. Leave open to air. Remove steri-strips after 7 to 10 days. Continue use of ice and elevation for associated swelling. Continue physical therapy, progress weightbearing status with PT. Call with any changes or concerns.        Follow up in 4 weeks. No imaging.

## 2022-06-20 NOTE — PROGRESS NOTES
"Chief Complaint  Follow-up of the Left Knee    Subjective          Hunter Guevara presents to [facility] for s/p left total knee arthroplasty with computer navigation performed on 06/07/2022 by Dr. Ruelas.  Patient is here today fully weightbearing.  He is currently attending physical therapy at [facility] in [facility] twice weekly.  He states he is progressing well in his therapy sessions.  He has no other additional complaints.  He is requesting refill prescription today.  He denies fever, chills, numbness or tingling.    Objective   Allergies   Allergen Reactions   • Penicillins Hives     AS A INFANT. REPORTS HAS TAKEN KEFLEX AND HAD NO ISSUES WITH IT       Vital Signs:   Pulse 92   Ht 167.6 cm (66\")   Wt 78.7 kg (173 lb 9.6 oz)   SpO2 96%   BMI 28.02 kg/m²       Physical Exam  Constitutional:       Appearance: Normal appearance. Patient is well-developed and normal weight.   HENT:      Head: Normocephalic.      Right Ear: Hearing and external ear normal.      Left Ear: Hearing and external ear normal.      Nose: Nose normal.   Eyes:      Conjunctiva/sclera: Conjunctivae normal.   Cardiovascular:      Rate and Rhythm: Normal rate.   Pulmonary:      Effort: Pulmonary effort is normal.      Breath sounds: No wheezing or rales.   Abdominal:      Palpations: Abdomen is soft.      Tenderness: There is no abdominal tenderness.   Musculoskeletal:      Cervical back: Normal range of motion.   Skin:     Findings: No rash.   Neurological:      Mental Status: Patient is alert and oriented to person, place, and time.   Psychiatric:         Mood and Affect: Mood and affect normal.         Judgment: Judgment normal.     Ortho Exam  Left knee: Staples removed-well-healing surgical incision without surrounding erythema or active drainage.  Mild-moderate swelling, skin dry and intact, no discoloration.  Full knee extension.  Flexion to 100 degrees.  Full plantarflexion and dorsiflexion of the ankle.  " Patient is here today fully weightbearing, gait is nonantalgic.  Sensation and pulses intact.  Neurovascular intact distally.    Result Review :   The following data was reviewed by: OSCAR Dominguez on 06/20/2022:         Imaging Results (Most Recent)     Procedure Component Value Units Date/Time    XR Knee 3 View Left [[id number]] Resulted: 06/20/22 1107     Updated: 06/20/22 1107    Narrative:      X-Ray Report:  Study: X-rays ordered, taken in the office, and reviewed today  Site: left kne Xray  Indication: TKA   View: AP, Lateral and Sunrise view(s)  Findings: Intact left total knee arthroplasty, no evidence of hardware   failure or loosening.   Prior studies available for comparison: yes                   Assessment and Plan    Problem List Items Addressed This Visit        Musculoskeletal and Injuries    Aftercare following left knee joint replacement surgery      Other Visit Diagnoses     Left knee pain, unspecified chronicity    -  Primary    Relevant Orders    XR Knee 3 View Left (Completed)          Follow Up   Return in about 4 weeks (around 7/18/2022).  Patient Instructions   Staples removed - Keep the incision clean and dry. Leave open to air. Remove steri-strips after 7 to 10 days. Continue use of ice and elevation for associated swelling. Continue physical therapy, progress weightbearing status with PT. Call with any changes or concerns.        Follow up in 4 weeks. No imaging.     Patient was given instructions and counseling regarding his condition or for health maintenance advice. Please see specific information pulled into the AVS if appropriate.

## 2022-06-22 ENCOUNTER — TREATMENT (OUTPATIENT)
Dept: PHYSICAL THERAPY | Facility: CLINIC | Age: 57
End: 2022-06-22

## 2022-06-22 DIAGNOSIS — R26.9 GAIT DISTURBANCE: ICD-10-CM

## 2022-06-22 DIAGNOSIS — M25.562 LEFT KNEE PAIN, UNSPECIFIED CHRONICITY: ICD-10-CM

## 2022-06-22 DIAGNOSIS — Z96.652 S/P TKR (TOTAL KNEE REPLACEMENT), LEFT: Primary | ICD-10-CM

## 2022-06-22 DIAGNOSIS — M25.662 KNEE STIFFNESS, LEFT: ICD-10-CM

## 2022-06-22 PROCEDURE — 97530 THERAPEUTIC ACTIVITIES: CPT | Performed by: PHYSICAL THERAPIST

## 2022-06-22 PROCEDURE — 97110 THERAPEUTIC EXERCISES: CPT | Performed by: PHYSICAL THERAPIST

## 2022-06-22 NOTE — PROGRESS NOTES
Physical Therapy Daily Treatment Note        Patient: Hunter Guevara   : 1965  Diagnosis/ICD-10 Code:  S/P TKR (total knee replacement), left [Z96.652]  Referring practitioner: Ricco Ruelas MD  Date of Initial Visit: Type: THERAPY  Noted: 2022  Today's Date: 2022  Patient seen for 4 sessions             Subjective   Hunter Guevara reports: knee feeling pretty good, states that he got the staple out on Monday; a little itchy without them. Reports taking a pain pill before PT session.    Objective   Knee Flexion: 105 deg c step stretch    See Exercise, Manual, and Modality Logs for complete treatment.       Assessment/Plan  Hunter progressing as evident by decreased overall knee pain. Pt able to achieve 105 deg of knee flexion. Pt would benefit from skilled PT to address Range of Motion  and Strength deficits, pain management and any concerns with ADLs.     Progress per Plan of Care           Timed:  Manual Therapy:         mins  01664;  Therapeutic Exercise:    20     mins  78274;     Neuromuscular Marty:        mins  21816;    Therapeutic Activity:     10     mins  81399;     Gait Training:           mins  83174;       Untimed:  Electrical Stimulation:         mins  43374 ( );  Mechanical Traction:         mins  96661;       Timed Treatment:   30   mins   Total Treatment:     30   mins      Electronically signed:   Mariah Mitchell PTA  Physical Therapist Assistant  [facility] License #: [id number]

## 2022-06-24 ENCOUNTER — TELEPHONE (OUTPATIENT)
Dept: PHYSICAL THERAPY | Facility: CLINIC | Age: 57
End: 2022-06-24

## 2022-06-30 ENCOUNTER — TREATMENT (OUTPATIENT)
Dept: PHYSICAL THERAPY | Facility: CLINIC | Age: 57
End: 2022-06-30

## 2022-06-30 DIAGNOSIS — R26.9 GAIT DISTURBANCE: ICD-10-CM

## 2022-06-30 DIAGNOSIS — Z96.652 S/P TKR (TOTAL KNEE REPLACEMENT), LEFT: Primary | ICD-10-CM

## 2022-06-30 DIAGNOSIS — M25.662 KNEE STIFFNESS, LEFT: ICD-10-CM

## 2022-06-30 DIAGNOSIS — M25.562 LEFT KNEE PAIN, UNSPECIFIED CHRONICITY: ICD-10-CM

## 2022-06-30 PROCEDURE — 97530 THERAPEUTIC ACTIVITIES: CPT | Performed by: PHYSICAL THERAPIST

## 2022-06-30 PROCEDURE — 97110 THERAPEUTIC EXERCISES: CPT | Performed by: PHYSICAL THERAPIST

## 2022-06-30 NOTE — PROGRESS NOTES
Physical Therapy Daily Treatment Note    Patient: Hunter Guevara   : 1965  Diagnosis/ICD-10 Code:  S/P TKR (total knee replacement), left [Z96.652]  Referring practitioner: Ricco Ruelas MD  Date of Initial Visit: Type: THERAPY  Noted: 2022  Today's Date: 2022  Patient seen for 5 sessions           Subjective Evaluation    History of Present Illness  Mechanism of injury: Pt reporting he feels stronger, still been doing his stretches at home.     Pain  Current pain rating: 3           Objective   See Exercise, Manual, and Modality Logs for complete treatment.       Assessment & Plan     Assessment    Assessment details: Pt tolerated today's session well, achieves 114 degrees flexion at the stairs today. Extension is 0 degrees, still having swelling and tightness. Pt would benefit from continued skilled therapy to address deficits. Progress per plan of care.                 Manual Therapy:    0     mins  80065;  Therapeutic Exercise:    18     mins  12505;     Neuromuscular Marty:    0    mins  14757;    Therapeutic Activity:     10     mins  39180;     Gait Trainin     mins  06896;     Ultrasound:     0     mins  64081;    Electrical Stimulation:    0     mins  16727 ( );  Dry Needling     0     mins self-pay;  Aquatic Therapy    0     mins  71603;  Mechanical Traction    0     mins  14446  Moist Heat     0     mins  No charge    Timed Treatment:   28   mins   Total Treatment:     28   mins    Chacho Mitchell PT  Physical Therapist    Electronically signed 2022    [place] License: PT - [id number]

## 2022-07-05 DIAGNOSIS — M17.12 PRIMARY OSTEOARTHRITIS OF LEFT KNEE: ICD-10-CM

## 2022-07-05 NOTE — TELEPHONE ENCOUNTER
Caller: NIKUNJ BOND    Relationship: PATIENT    Best call back number:  [phone]  Requested Prescriptions:   Requested Prescriptions     Pending Prescriptions Disp Refills   • oxyCODONE-acetaminophen (PERCOCET) 7.5-325 MG per tablet 42 tablet 0     Sig: Take 1 tablet by mouth Every 4 (Four) Hours As Needed for Moderate Pain .        Pharmacy where request should be sent:  [facility]  [phone]    Additional details provided by patient: PATIENT HAS #4 TABS LEFT.    Does the patient have less than a 3 day supply:  [x] Yes  [] No    Rossi Roach   07/05/22 12:50 EDT

## 2022-07-06 ENCOUNTER — TREATMENT (OUTPATIENT)
Dept: PHYSICAL THERAPY | Facility: CLINIC | Age: 57
End: 2022-07-06

## 2022-07-06 DIAGNOSIS — R26.9 GAIT DISTURBANCE: ICD-10-CM

## 2022-07-06 DIAGNOSIS — M25.562 LEFT KNEE PAIN, UNSPECIFIED CHRONICITY: ICD-10-CM

## 2022-07-06 DIAGNOSIS — M25.662 KNEE STIFFNESS, LEFT: ICD-10-CM

## 2022-07-06 DIAGNOSIS — Z96.652 S/P TKR (TOTAL KNEE REPLACEMENT), LEFT: Primary | ICD-10-CM

## 2022-07-06 PROCEDURE — 97110 THERAPEUTIC EXERCISES: CPT | Performed by: PHYSICAL THERAPIST

## 2022-07-06 PROCEDURE — 97530 THERAPEUTIC ACTIVITIES: CPT | Performed by: PHYSICAL THERAPIST

## 2022-07-06 RX ORDER — OXYCODONE AND ACETAMINOPHEN 7.5; 325 MG/1; MG/1
1 TABLET ORAL EVERY 4 HOURS PRN
Qty: 42 TABLET | Refills: 0 | Status: SHIPPED | OUTPATIENT
Start: 2022-07-06 | End: 2022-07-08 | Stop reason: SDUPTHER

## 2022-07-06 NOTE — PROGRESS NOTES
Progress Assessment        Patient: Hunter Guevara   : 1965  Diagnosis/ICD-10 Code:  S/P TKR (total knee replacement), left [Z96.652]  Referring practitioner: Ricco Ruelas MD  Date of Initial Visit: Type: THERAPY  Noted: 2022  Today's Date: 2022  Patient seen for 6 sessions      Subjective:     Subjective Questionnaire: LEFS: 47/80 = 41% limitation  Clinical Progress: improved  Home Program Compliance: Yes  Treatment has included: therapeutic exercise, neuromuscular re-education, manual therapy and therapeutic activity    Subjective Evaluation    History of Present Illness  Mechanism of injury: Pt reporting he is feeling much better, pain is less, stiffness improving. He is mostly back to everything, just still having trouble going down the stairs, which he can do it, but just having more pain with descending the stairs.    Pain  Current pain rating: 3         Objective     Left Knee   Incision healing well        Active Range of Motion   Left Knee   Flexion: 122 degrees   Extension: Left knee active extension: 0 degrees           Strength/Myotome Testing      Left Knee   Flexion: 4+  Extension: 4       Ambulation      Comments   Ambulates with a stiff knee in varus posture, decreased extension, but no assistive device      Assessment & Plan     Assessment  Impairments: abnormal gait, abnormal muscle firing, abnormal or restricted ROM, activity intolerance, impaired balance, impaired physical strength, pain with function and weight-bearing intolerance  Functional Limitations: walking, standing and stooping  Assessment details: The patient presents to physical therapy s/p Left TKA 22. The patient presents with associated knee weakness, stiffness, antalgic gait, and functional deficits (LEFS).  He has progressed very well, achieving his ROM goals. He is progressing well with strength and gait, but still having pain/tightness with descending the stairs, but is able to perform reciprocally. The  patient would benefit from skilled PT intervention to address the above mentioned functional limitations.     Prognosis: good    Goals  Plan Goals: KNEE PROBLEMS    1. The patient has limited ROM of the left knee.   LTG 1:12 weeks:  The patient will demonstrate 0 to 120 degrees of ROM for the left knee in order to allow patient to ambulate.    STATUS:  MET   STG 1a: 6 weeks:  The patient will demonstrate 5 to 105 degrees of ROM for the left knee.    STATUS: MET   TREATMENT: Manual therapy, therapeutic exercise, home exercise instruction, and modalities as needed to include:  moist heat, electrical stimulation, ultrasound, and ice.    2. The patient has limited strength of the left knee.   LTG 2: 12 weeks: The patient will demonstrate 5 /5 strength for knee flexion and extension in order to allow patient improved joint stability    STATUS:  Not met, progressing   STG 2a: 6 weeks: The patient will demonstrate 4 /5 strength for knee flexion and extension    STATUS:  MET   TREATMENT: Manual therapy, therapeutic exercise, home exercise instruction, aquatic therapy, and modalities as needed to include:  moist heat, electrical stimulation, ultrasound, and ice.     3. The patient has gait dysfunction.   LTG 3: 12 weeks:  The patient will ambulate without assistive device, independently, for community distances with normal biomechanics of the left lower extremity in order to improve mobility and allow patient to perform activities such as grocery shopping with greater ease.    STATUS:  MET   STG 3a: 4 weeks: The patient will ambulate with a single tipped cane with appropriate gait mechanics.    STATUS:  MET   TREATMENT: Gait training, aquatic therapy, therapeutic exercise, and home exercise instruction.    4. Mobility: Walking/Moving Around Functional Limitation     LTG 4: 12 weeks:  The patient will demonstrate 25 % limitation by achieving a score of 60/80 on the LEFS.    STATUS:  Not met, progressing   STG 4 a: 6 weeks:   The patient will demonstrate 50 % limitation by achieving a score of 40/80 on the LEFS.      STATUS: MET   TREATMENT:  Manual therapy, therapeutic exercise, home exercise instruction, and modalities as needed to include: moist heat, electrical stimulation, and ultrasound.       Plan  Therapy options: will be seen for skilled therapy services  Planned modality interventions: TENS, cryotherapy and thermotherapy (hydrocollator packs)  Planned therapy interventions: functional ROM exercises, gait training, home exercise program, manual therapy, strengthening, stretching, therapeutic activities, soft tissue mobilization, joint mobilization, neuromuscular re-education, balance/weight-bearing training and transfer training  Other planned therapy interventions: aquatic therapy  Frequency: 3x week  Duration in weeks: 12  Treatment plan discussed with: patient      Progress toward previous goals: Partially Met    See Exercise, Manual, and Modality Logs for complete treatment.         Recommendations: Continue as planned  Timeframe: 2 months  Prognosis to achieve goals: good    PT Signature: Chacho Mitchell PT    Electronically signed 2022    [place] License: PT - [id number]     Based upon review of the patient's progress and continued therapy plan, it is my medical opinion that Hunter Guevara should continue physical therapy treatment at [facility]  [address] [place] [address]  [facility] [address] [phone].      Timed:         Manual Therapy:    0     mins  12992;     Therapeutic Exercise:    19     mins  68664;     Neuromuscular Marty:    0    mins  67059;    Therapeutic Activity:     10     mins  31622;     Gait Trainin     mins  00873;     Ultrasound:     0     mins  84527;    Ionto                               0    mins   35432  Self Care                       0     mins   50212  Aquatic                          0     mins 00712          Timed Treatment:   29   mins   Total  Treatment:     29   mins      I certify that the therapy services are furnished while this patient is under my care.  The services outlined above are required by this patient, and will be reviewed every 90 days.

## 2022-07-08 ENCOUNTER — TELEPHONE (OUTPATIENT)
Dept: ORTHOPEDIC SURGERY | Facility: CLINIC | Age: 57
End: 2022-07-08

## 2022-07-08 DIAGNOSIS — M17.12 PRIMARY OSTEOARTHRITIS OF LEFT KNEE: ICD-10-CM

## 2022-07-08 RX ORDER — OXYCODONE AND ACETAMINOPHEN 7.5; 325 MG/1; MG/1
1 TABLET ORAL EVERY 4 HOURS PRN
Qty: 42 TABLET | Refills: 0 | Status: SHIPPED | OUTPATIENT
Start: 2022-07-08

## 2022-07-08 NOTE — TELEPHONE ENCOUNTER
Patient called back again to check the status. States he has been in town waiting for med to be sent to [facility] since 11am. Advised patient that Dr. Ruelas is seeing patients and will review once he is done with clinic this afternoon. Patient voiced understanding.

## 2022-07-08 NOTE — TELEPHONE ENCOUNTER
PAIN MEDS WERE SENT TO [facility] AND SHOULD HAVE BEEN SENT TO [facility] AT [facility].  I CALLED AND CANCELED THE SCRIPT AT [facility].  PLEASE SEND TO [facility]--THANKS

## 2022-07-08 NOTE — TELEPHONE ENCOUNTER
Hub staff attempted to follow warm transfer process and was unsuccessful     Caller: Hunter Guevara    Relationship to patient: Self    Best call back number: [phone]    Patient is needing: PATIENT CALLED TO FOLLOW UP ON MEDICATION REFILL REQUEST- SPOKE WITH SOHAN MUNOZ -- NEEDS TO BE SENT TO [facility] - PATIENT HAS BEEN OUT OF MEDICATION FOR TWO DAYS- PLEASE ADVISE. PATIENT. THANK YOU.

## 2022-07-13 ENCOUNTER — TREATMENT (OUTPATIENT)
Dept: PHYSICAL THERAPY | Facility: CLINIC | Age: 57
End: 2022-07-13

## 2022-07-13 DIAGNOSIS — M25.662 KNEE STIFFNESS, LEFT: ICD-10-CM

## 2022-07-13 DIAGNOSIS — Z96.652 S/P TKR (TOTAL KNEE REPLACEMENT), LEFT: Primary | ICD-10-CM

## 2022-07-13 DIAGNOSIS — R26.9 GAIT DISTURBANCE: ICD-10-CM

## 2022-07-13 DIAGNOSIS — M25.562 LEFT KNEE PAIN, UNSPECIFIED CHRONICITY: ICD-10-CM

## 2022-07-13 PROCEDURE — 97110 THERAPEUTIC EXERCISES: CPT | Performed by: PHYSICAL THERAPIST

## 2022-07-13 PROCEDURE — 97530 THERAPEUTIC ACTIVITIES: CPT | Performed by: PHYSICAL THERAPIST

## 2022-07-13 NOTE — PROGRESS NOTES
Physical Therapy Daily Treatment Note        Patient: Hunter Guevara   : 1965  Diagnosis/ICD-10 Code:  S/P TKR (total knee replacement), left [Z96.652]  Referring practitioner: Ricco Ruelas MD  Date of Initial Visit: Type: THERAPY  Noted: 2022  Today's Date: 2022  Patient seen for 7 sessions             Subjective   Hunter Guevara reports: not having pain in the knee, states that he has been on his motorcycle without any issues.  Reports his biggest complaint still being the pain at night, isn't able to get comfortable.     Objective   No complaints of increased pain or discomfort.     See Exercise, Manual, and Modality Logs for complete treatment.       Assessment/Plan  Hunter progressing as evident by decreased overall knee pain. Pt tolerated exercises well, no complaints of increased pain or discomfort. Pt would benefit from skilled PT to address Range of Motion  and Strength deficits, pain management and any concerns with ADLs.         Progress per Plan of Care           Timed:  Manual Therapy:         mins  22345;  Therapeutic Exercise:    20     mins  90889;     Neuromuscular Marty:        mins  34436;    Therapeutic Activity:     10     mins  68713;     Gait Training:           mins  84721;    Aquatic Therapy:          mins  17437;       Untimed:  Electrical Stimulation:         mins  58274 ( );  Mechanical Traction:         mins  16355;       Timed Treatment:   30   mins   Total Treatment:     30   mins      Electronically signed:   Mariah Mitchell PTA  Physical Therapist Assistant  [facility] License #: [id number]

## 2022-07-20 ENCOUNTER — OFFICE VISIT (OUTPATIENT)
Dept: ORTHOPEDIC SURGERY | Facility: CLINIC | Age: 57
End: 2022-07-20

## 2022-07-20 VITALS — HEART RATE: 82 BPM | WEIGHT: 173 LBS | HEIGHT: 66 IN | BODY MASS INDEX: 27.8 KG/M2 | OXYGEN SATURATION: 98 %

## 2022-07-20 DIAGNOSIS — Z96.652 AFTERCARE FOLLOWING LEFT KNEE JOINT REPLACEMENT SURGERY: Primary | ICD-10-CM

## 2022-07-20 DIAGNOSIS — Z47.1 AFTERCARE FOLLOWING LEFT KNEE JOINT REPLACEMENT SURGERY: Primary | ICD-10-CM

## 2022-07-20 PROCEDURE — 99024 POSTOP FOLLOW-UP VISIT: CPT | Performed by: PHYSICIAN ASSISTANT

## 2022-07-20 NOTE — PROGRESS NOTES
"Chief Complaint  Pain and Follow-up of the Left Knee    Subjective          Hunter Guevara presents to [facility] for s/p left total knee arthroplasty with computer navigation performed on 06/07/2022 by Dr. Ruelas.  Patient states he was discharged from physical therapy as of a week ago.  He states he made great progress and is pleased with the progress and outcome is made following left total knee arthroplasty.  He states symptoms have improved from preoperatively.  He is here today fully weightbearing.  He has no other additional complaints.    Objective   Allergies   Allergen Reactions   • Penicillins Hives     AS A INFANT. REPORTS HAS TAKEN KEFLEX AND HAD NO ISSUES WITH IT       Vital Signs:   Pulse 82   Ht 167.6 cm (66\")   Wt 78.5 kg (173 lb)   SpO2 98%   BMI 27.92 kg/m²       Physical Exam  Constitutional:       Appearance: Normal appearance. Patient is well-developed and normal weight.   HENT:      Head: Normocephalic.      Right Ear: Hearing and external ear normal.      Left Ear: Hearing and external ear normal.      Nose: Nose normal.   Eyes:      Conjunctiva/sclera: Conjunctivae normal.   Cardiovascular:      Rate and Rhythm: Normal rate.   Pulmonary:      Effort: Pulmonary effort is normal.      Breath sounds: No wheezing or rales.   Abdominal:      Palpations: Abdomen is soft.      Tenderness: There is no abdominal tenderness.   Musculoskeletal:      Cervical back: Normal range of motion.   Skin:     Findings: No rash.   Neurological:      Mental Status: Patient is alert and oriented to person, place, and time.   Psychiatric:         Mood and Affect: Mood and affect normal.         Judgment: Judgment normal.     Ortho Exam  Knee: Well-healed surgical incision, no swelling or discoloration, nontender to palpate.  Skin dry and intact.  Full knee extension, flexion to 125 degrees.  Stable to varus and valgus stress.  The patella is well tracking.  Full plantarflexion and " dorsiflexion of the ankle.  Fully weightbearing, nonantalgic gait with good heel strike.  Sensation is intact.  Neurovascular intact distally.  Result Review :            Imaging Results (Most Recent)     None                Assessment and Plan    Problem List Items Addressed This Visit        Musculoskeletal and Injuries    Aftercare following left knee joint replacement surgery - Primary          Follow Up   Return in about 6 weeks (around 8/31/2022).  Patient Instructions   Patient doing very well and has been discharged from PT. Encouraged to continue HEP and icing as needed for pain/swelling. Avoid pivoting and deep squatting.     Follow-up in 6 weeks. X-rays needed.     Patient was given instructions and counseling regarding his condition or for health maintenance advice. Please see specific information pulled into the AVS if appropriate.

## 2022-07-20 NOTE — PATIENT INSTRUCTIONS
Patient doing very well and has been discharged from PT. Encouraged to continue HEP and icing as needed for pain/swelling. Avoid pivoting and deep squatting.     Follow-up in 6 weeks. X-rays needed.

## 2022-07-25 ENCOUNTER — DOCUMENTATION (OUTPATIENT)
Dept: PHYSICAL THERAPY | Facility: CLINIC | Age: 57
End: 2022-07-25

## 2022-07-25 NOTE — PROGRESS NOTES
Discharge Summary  Discharge Summary from Physical Therapy Report      Dates  PT visit: 6/9/22 - 7/13/22  Number of Visits: 7     Goals: All Met    Discharge Plan: Continue with current home exercise program as instructed    Comments Pt has progressed very well in therapy and has achieved his goals, expected to continue to progress independently.    Date of Discharge 7/25/22        Chacho Mitchell PT  Physical Therapist    Electronically signed 7/25/2022    [place] License: PT - [id number]

## 2022-08-31 ENCOUNTER — OFFICE VISIT (OUTPATIENT)
Dept: ORTHOPEDIC SURGERY | Facility: CLINIC | Age: 57
End: 2022-08-31

## 2022-08-31 VITALS — HEIGHT: 66 IN | HEART RATE: 72 BPM | BODY MASS INDEX: 27.64 KG/M2 | WEIGHT: 172 LBS | OXYGEN SATURATION: 100 %

## 2022-08-31 DIAGNOSIS — Z96.652 AFTERCARE FOLLOWING LEFT KNEE JOINT REPLACEMENT SURGERY: Primary | ICD-10-CM

## 2022-08-31 DIAGNOSIS — Z47.1 AFTERCARE FOLLOWING LEFT KNEE JOINT REPLACEMENT SURGERY: Primary | ICD-10-CM

## 2022-08-31 PROCEDURE — 99024 POSTOP FOLLOW-UP VISIT: CPT | Performed by: PHYSICIAN ASSISTANT

## 2022-08-31 RX ORDER — OMEGA-3-ACID ETHYL ESTERS 1 G/1
CAPSULE, LIQUID FILLED ORAL
COMMUNITY
Start: 2022-08-17

## 2022-08-31 NOTE — PROGRESS NOTES
"Chief Complaint  Pain and Follow-up of the Left Knee    Subjective      Hunter Guevara presents to [facility] for s/p left total knee arthroplasty performed with computer navigation performed on 06/07/22 by Dr. Ruelas. Patient was discharged from PT on July 13. He recently had a \"slip from wet wood board\". He had pain and swelling following his slip. He has improvement of his pain since this occurred. He is doing well and feels pain has improved from pre-operatively. He has no new or additional complaints.     Objective   Allergies   Allergen Reactions   • Penicillins Hives     AS A INFANT. REPORTS HAS TAKEN KEFLEX AND HAD NO ISSUES WITH IT       Vital Signs:   Pulse 72   Ht 167.6 cm (66\")   Wt 78 kg (172 lb)   SpO2 100%   BMI 27.76 kg/m²       Physical Exam    Constitutional: Awake, alert. Well nourished appearance.    Integumentary: Warm, dry, intact. No obvious rashes.    HENT: Atraumatic, normocephalic.   Respiratory: Non labored respirations .   Cardiovascular: Intact peripheral pulses.    Psychiatric: Normal mood and affect. A&O X3    Ortho Exam  LEFT KNEE: Well healed scar, skin dry and intact, no swelling or discoloration, non-tender to palpate. Full knee extension. Flexion to 130 degrees. No varus or valgus instability. Patella is well tracking. Good strength to the quadriceps and hamstrings muscles. Ankle motion intact. Smooth sit/stand transition. Fully weightbearing. Gait non-antalgic. Sensation intact. Neurovascular intact distally.     Imaging Results (Most Recent)     Procedure Component Value Units Date/Time    XR Knee 3 View Left [[id number]] Resulted: 08/31/22 1054     Updated: 08/31/22 1055    Narrative:      X-Ray Report:  Study: X-rays ordered, taken in the office, and reviewed today  Site: left knee Xray  Indication: TKA   View: AP, Lateral and Sunrise view(s)  Findings: Left total knee arthroplasty is intact. No evidence of loosening   or failure of hardware. "   Prior studies available for comparison: yes                       Assessment and Plan   Problem List Items Addressed This Visit        Musculoskeletal and Injuries    Aftercare following left knee joint replacement surgery - Primary    Relevant Orders    XR Knee 3 View Left (Completed)          Follow Up   Return in about 9 months (around 5/31/2023).    Patient Instructions   X-rays taken and reviewed. Note for release provided today. Patient is doing well today.      Ensure antibiotic prophylaxis is given prior to dental procedures.    Follow up at one year surgery anniversary. Repeat x-ray.       Patient was given instructions and counseling regarding his condition or for health maintenance advice. Please see specific information pulled into the AVS if appropriate.

## 2022-08-31 NOTE — PATIENT INSTRUCTIONS
X-rays taken and reviewed. Note for release provided today. Patient is doing well today.      Ensure antibiotic prophylaxis is given prior to dental procedures.    Follow up at one year surgery anniversary. Repeat x-ray.

## 2023-04-24 ENCOUNTER — PREP FOR SURGERY (OUTPATIENT)
Dept: OTHER | Facility: HOSPITAL | Age: 58
End: 2023-04-24
Payer: MEDICAID

## 2023-04-24 ENCOUNTER — OFFICE VISIT (OUTPATIENT)
Dept: SURGERY | Facility: CLINIC | Age: 58
End: 2023-04-24
Payer: MEDICAID

## 2023-04-24 VITALS — WEIGHT: 181 LBS | RESPIRATION RATE: 18 BRPM | BODY MASS INDEX: 29.09 KG/M2 | HEIGHT: 66 IN

## 2023-04-24 DIAGNOSIS — K40.91 RECURRENT LEFT INGUINAL HERNIA: Primary | ICD-10-CM

## 2023-04-24 RX ORDER — ONDANSETRON 2 MG/ML
4 INJECTION INTRAMUSCULAR; INTRAVENOUS EVERY 6 HOURS PRN
OUTPATIENT
Start: 2023-04-24

## 2023-04-24 RX ORDER — SODIUM CHLORIDE, SODIUM LACTATE, POTASSIUM CHLORIDE, CALCIUM CHLORIDE 600; 310; 30; 20 MG/100ML; MG/100ML; MG/100ML; MG/100ML
70 INJECTION, SOLUTION INTRAVENOUS CONTINUOUS
OUTPATIENT
Start: 2023-04-24

## 2023-04-24 RX ORDER — CEFAZOLIN SODIUM 2 G/100ML
2 INJECTION, SOLUTION INTRAVENOUS ONCE
OUTPATIENT
Start: 2023-04-24 | End: 2023-04-24

## 2023-04-24 NOTE — H&P (VIEW-ONLY)
Inpatient History and Physical Surgical Orders    Preadmission Location:   Preadmission Time:  Facility:  Surgery Date:  Surgery Time:  Preadmission Test date:     Chief Complaint  Outpatient History and Physical / Surgical Orders    Primary Care Provider: Gail Pennington APRN    Referring Provider: Gail Pennington APRN    Subjective      Patient Name: Hunter Guevara : 1965    HPI  The patient is a 57-year-old gentleman who we have taken care of in the past.  He is status post a bilateral robotic inguinal hernia repair in .  He has developed a recurrent bulge in the left groin.    Past History:  Medical History: has a past medical history of Diabetes mellitus, Gout, Hyperlipidemia, Hypertension, Inguinal hernia, and Osteoarthritis.   Surgical History: has a past surgical history that includes Shoulder surgery (Right); Ankle surgery (Right); Hernia repair; Tonsillectomy; Cyst Removal (Right); Ankle surgery (Left); and Total knee arthroplasty (Left, 2022).   Family History: family history is not on file.   Social History: reports that he has been smoking cigars. He has quit using smokeless tobacco. He reports current alcohol use. He reports that he does not currently use drugs after having used the following drugs: Marijuana, Amphetamines, and Cocaine(coke).  Allergies: Penicillins       Current Outpatient Medications:   •  allopurinol (ZYLOPRIM) 100 MG tablet, Take 1 tablet by mouth Daily., Disp: , Rfl:   •  apixaban (ELIQUIS) 2.5 MG tablet tablet, Take 1 tablet by mouth Every 12 (Twelve) Hours., Disp: 14 tablet, Rfl: 0  •  aspirin  MG tablet, Take 1 tablet by mouth Daily (start after finishing eliquis), Disp: 21 tablet, Rfl: 0  •  atorvastatin (LIPITOR) 40 MG tablet, Take 1 tablet by mouth Every Night., Disp: , Rfl:   •  glyburide (DIAbeta) 5 MG tablet, Take 1 tablet by mouth Daily With Breakfast. INSTRUCTED PER ANESTHESIA PROTOCOL, Disp: , Rfl:   •  lisinopril (PRINIVIL,ZESTRIL) 20 MG  "tablet, Take 1 tablet by mouth Daily., Disp: , Rfl:   •  metFORMIN (GLUCOPHAGE) 500 MG tablet, Take 1 tablet by mouth Daily With Breakfast. INSTRUCTED PER ANESTHESIA PROTOCOL, Disp: , Rfl:   •  multivitamin with minerals tablet tablet, Take 1 tablet by mouth Daily., Disp: , Rfl:   •  omega-3 acid ethyl esters (LOVAZA) 1 g capsule, , Disp: , Rfl:   •  oxyCODONE-acetaminophen (PERCOCET) 7.5-325 MG per tablet, Take 1 tablet by mouth Every 4 (Four) Hours As Needed for Moderate Pain ., Disp: 42 tablet, Rfl: 0  •  SUPER B COMPLEX/C PO, Take 1 tablet by mouth Daily., Disp: , Rfl:   •  vitamin D (ERGOCALCIFEROL) 1.25 MG (00211 UT) capsule capsule, Take 1 capsule by mouth Every 7 (Seven) Days. Reports is out and hasnt taken in about a week or so, Disp: , Rfl:        Objective   Vital Signs:   Resp 18   Ht 167.6 cm (65.98\")   Wt 82.1 kg (181 lb)   BMI 29.23 kg/m²       Physical Exam  Vitals and nursing note reviewed.   Constitutional:       Appearance: Normal appearance. The patient is well-developed.   Cardiovascular:      Rate and Rhythm: Normal rate and regular rhythm.   Pulmonary:      Effort: Pulmonary effort is normal.      Breath sounds: Normal air entry.   Abdominal:      General: Bowel sounds are normal.      Palpations: Abdomen is soft.      Skin:     General: Skin is warm and dry.   Neurological:      Mental Status: The patient is alert and oriented to person, place, and time.      Motor: Motor function is intact.   Psychiatric:         Mood and Affect: Mood normal.   Groin: He has a reducible left inguinal hernia on physical exam    Result Review :               Assessment and Plan   Diagnoses and all orders for this visit:    1. Recurrent left inguinal hernia (Primary)    We will schedule him for an open left inguinal hernia repair given the fact that he has had a prior preperitoneal approach.  I have described the procedure to him as well as the risk and benefits and he is agreeable to proceeding.    I  Melecio " NGUYỄN Burger MD  04/24/2023

## 2023-04-24 NOTE — PROGRESS NOTES
Inpatient History and Physical Surgical Orders    Preadmission Location:   Preadmission Time:  Facility:  Surgery Date:  Surgery Time:  Preadmission Test date:     Chief Complaint  Outpatient History and Physical / Surgical Orders    Primary Care Provider: Gail Pennington APRN    Referring Provider: Gail Pennington APRN    Subjective      Patient Name: Hunter Guevara : 1965    HPI  The patient is a 57-year-old gentleman who we have taken care of in the past.  He is status post a bilateral robotic inguinal hernia repair in .  He has developed a recurrent bulge in the left groin.    Past History:  Medical History: has a past medical history of Diabetes mellitus, Gout, Hyperlipidemia, Hypertension, Inguinal hernia, and Osteoarthritis.   Surgical History: has a past surgical history that includes Shoulder surgery (Right); Ankle surgery (Right); Hernia repair; Tonsillectomy; Cyst Removal (Right); Ankle surgery (Left); and Total knee arthroplasty (Left, 2022).   Family History: family history is not on file.   Social History: reports that he has been smoking cigars. He has quit using smokeless tobacco. He reports current alcohol use. He reports that he does not currently use drugs after having used the following drugs: Marijuana, Amphetamines, and Cocaine(coke).  Allergies: Penicillins       Current Outpatient Medications:   •  allopurinol (ZYLOPRIM) 100 MG tablet, Take 1 tablet by mouth Daily., Disp: , Rfl:   •  apixaban (ELIQUIS) 2.5 MG tablet tablet, Take 1 tablet by mouth Every 12 (Twelve) Hours., Disp: 14 tablet, Rfl: 0  •  aspirin  MG tablet, Take 1 tablet by mouth Daily (start after finishing eliquis), Disp: 21 tablet, Rfl: 0  •  atorvastatin (LIPITOR) 40 MG tablet, Take 1 tablet by mouth Every Night., Disp: , Rfl:   •  glyburide (DIAbeta) 5 MG tablet, Take 1 tablet by mouth Daily With Breakfast. INSTRUCTED PER ANESTHESIA PROTOCOL, Disp: , Rfl:   •  lisinopril (PRINIVIL,ZESTRIL) 20 MG  "tablet, Take 1 tablet by mouth Daily., Disp: , Rfl:   •  metFORMIN (GLUCOPHAGE) 500 MG tablet, Take 1 tablet by mouth Daily With Breakfast. INSTRUCTED PER ANESTHESIA PROTOCOL, Disp: , Rfl:   •  multivitamin with minerals tablet tablet, Take 1 tablet by mouth Daily., Disp: , Rfl:   •  omega-3 acid ethyl esters (LOVAZA) 1 g capsule, , Disp: , Rfl:   •  oxyCODONE-acetaminophen (PERCOCET) 7.5-325 MG per tablet, Take 1 tablet by mouth Every 4 (Four) Hours As Needed for Moderate Pain ., Disp: 42 tablet, Rfl: 0  •  SUPER B COMPLEX/C PO, Take 1 tablet by mouth Daily., Disp: , Rfl:   •  vitamin D (ERGOCALCIFEROL) 1.25 MG (17431 UT) capsule capsule, Take 1 capsule by mouth Every 7 (Seven) Days. Reports is out and hasnt taken in about a week or so, Disp: , Rfl:        Objective   Vital Signs:   Resp 18   Ht 167.6 cm (65.98\")   Wt 82.1 kg (181 lb)   BMI 29.23 kg/m²       Physical Exam  Vitals and nursing note reviewed.   Constitutional:       Appearance: Normal appearance. The patient is well-developed.   Cardiovascular:      Rate and Rhythm: Normal rate and regular rhythm.   Pulmonary:      Effort: Pulmonary effort is normal.      Breath sounds: Normal air entry.   Abdominal:      General: Bowel sounds are normal.      Palpations: Abdomen is soft.      Skin:     General: Skin is warm and dry.   Neurological:      Mental Status: The patient is alert and oriented to person, place, and time.      Motor: Motor function is intact.   Psychiatric:         Mood and Affect: Mood normal.   Groin: He has a reducible left inguinal hernia on physical exam    Result Review :               Assessment and Plan   Diagnoses and all orders for this visit:    1. Recurrent left inguinal hernia (Primary)    We will schedule him for an open left inguinal hernia repair given the fact that he has had a prior preperitoneal approach.  I have described the procedure to him as well as the risk and benefits and he is agreeable to proceeding.    I  Melecio " NGUYỄN Burger MD  04/24/2023

## 2023-05-23 NOTE — PRE-PROCEDURE INSTRUCTIONS
PATIENT INSTRUCTED TO BE:    - NPO AFTER MIDNIGHT EXCEPT CAN HAVE CLEAR LIQUIDS 2 HOURS PRIOR TO SURGERY ARRIVAL TIME     - TO HOLD ALL VITAMINS, SUPPLEMENTS, NSAIDS FOR ONE WEEK PRIOR TO THEIR SURGICAL PROCEDURE    - INSTRUCTED PT TO USE SURGICAL SOAP 1 TIME THE NIGHT PRIOR TO SURGERY OR THE AM OF SURGERY.   USE SOAP FROM NECK TO TOES AVOID THEIR FACE, HAIR, AND PRIVATE PARTS. INSTRUCTED NO LOTIONS, JEWELRY, PIERCINGS, OR DEODORANT DAY OF SURGERY    - IF DIABETIC, CHECK BLOOD GLUCOSE IF LESS THAN 70 CALL PREOP AREA -AM OF SURGERY     - INSTRUCTED TO TAKE THE FOLLOWING MEDICATIONS THE DAY OF SURGERY:       ALLOPURINOL, LIPITOR    TAKE METFORMIN BY 6PM NIGHT PRIOR TO SURGERY     PATIENT VERBALIZED UNDERSTANDING

## 2023-05-24 ENCOUNTER — HOSPITAL ENCOUNTER (OUTPATIENT)
Facility: HOSPITAL | Age: 58
Setting detail: HOSPITAL OUTPATIENT SURGERY
Discharge: HOME | End: 2023-05-24
Attending: SURGERY | Admitting: SURGERY
Payer: MEDICAID

## 2023-05-24 ENCOUNTER — ANESTHESIA (OUTPATIENT)
Dept: PERIOP | Facility: HOSPITAL | Age: 58
End: 2023-05-24
Payer: MEDICAID

## 2023-05-24 ENCOUNTER — ANESTHESIA EVENT (OUTPATIENT)
Dept: PERIOP | Facility: HOSPITAL | Age: 58
End: 2023-05-24
Payer: MEDICAID

## 2023-05-24 VITALS
OXYGEN SATURATION: 97 % | HEART RATE: 70 BPM | TEMPERATURE: 97.8 F | SYSTOLIC BLOOD PRESSURE: 137 MMHG | WEIGHT: 182.32 LBS | HEIGHT: 66 IN | BODY MASS INDEX: 29.3 KG/M2 | RESPIRATION RATE: 19 BRPM | DIASTOLIC BLOOD PRESSURE: 98 MMHG

## 2023-05-24 DIAGNOSIS — K40.91 RECURRENT LEFT INGUINAL HERNIA: ICD-10-CM

## 2023-05-24 LAB
GLUCOSE BLDC GLUCOMTR-MCNC: 175 MG/DL (ref 70–99)
GLUCOSE BLDC GLUCOMTR-MCNC: 199 MG/DL (ref 70–99)
QT INTERVAL: 400 MS

## 2023-05-24 PROCEDURE — 25010000002 ONDANSETRON PER 1 MG: Performed by: NURSE ANESTHETIST, CERTIFIED REGISTERED

## 2023-05-24 PROCEDURE — 0 HYDROMORPHONE 1 MG/ML SOLUTION: Performed by: NURSE ANESTHETIST, CERTIFIED REGISTERED

## 2023-05-24 PROCEDURE — 25010000002 KETOROLAC TROMETHAMINE PER 15 MG: Performed by: NURSE ANESTHETIST, CERTIFIED REGISTERED

## 2023-05-24 PROCEDURE — 25010000002 MIDAZOLAM PER 1MG: Performed by: ANESTHESIOLOGY

## 2023-05-24 PROCEDURE — 82948 REAGENT STRIP/BLOOD GLUCOSE: CPT

## 2023-05-24 PROCEDURE — 25010000002 METOCLOPRAMIDE PER 10 MG: Performed by: ANESTHESIOLOGY

## 2023-05-24 PROCEDURE — C1781 MESH (IMPLANTABLE): HCPCS | Performed by: SURGERY

## 2023-05-24 PROCEDURE — 25010000002 PROPOFOL 10 MG/ML EMULSION: Performed by: NURSE ANESTHETIST, CERTIFIED REGISTERED

## 2023-05-24 PROCEDURE — 25010000002 FENTANYL CITRATE (PF) 50 MCG/ML SOLUTION: Performed by: NURSE ANESTHETIST, CERTIFIED REGISTERED

## 2023-05-24 PROCEDURE — 25010000002 DEXAMETHASONE PER 1 MG: Performed by: NURSE ANESTHETIST, CERTIFIED REGISTERED

## 2023-05-24 PROCEDURE — 25010000002 SUGAMMADEX 200 MG/2ML SOLUTION: Performed by: NURSE ANESTHETIST, CERTIFIED REGISTERED

## 2023-05-24 PROCEDURE — 25010000002 CEFAZOLIN IN DEXTROSE 2-4 GM/100ML-% SOLUTION: Performed by: SURGERY

## 2023-05-24 PROCEDURE — 88304 TISSUE EXAM BY PATHOLOGIST: CPT | Performed by: SURGERY

## 2023-05-24 PROCEDURE — 93005 ELECTROCARDIOGRAM TRACING: CPT | Performed by: ANESTHESIOLOGY

## 2023-05-24 PROCEDURE — 49520 REREPAIR ING HERNIA REDUCE: CPT | Performed by: SURGERY

## 2023-05-24 DEVICE — BARD MESH LARGE PRE-SHAPED WITH KEYHOLE
Type: IMPLANTABLE DEVICE | Site: INGUINAL | Status: FUNCTIONAL
Brand: BARD MESH PRE-SHAPED

## 2023-05-24 RX ORDER — DEXAMETHASONE SODIUM PHOSPHATE 4 MG/ML
INJECTION, SOLUTION INTRA-ARTICULAR; INTRALESIONAL; INTRAMUSCULAR; INTRAVENOUS; SOFT TISSUE AS NEEDED
Status: DISCONTINUED | OUTPATIENT
Start: 2023-05-24 | End: 2023-05-24 | Stop reason: SURG

## 2023-05-24 RX ORDER — HYDROCODONE BITARTRATE AND ACETAMINOPHEN 5; 325 MG/1; MG/1
1 TABLET ORAL EVERY 4 HOURS PRN
Qty: 15 TABLET | Refills: 0 | Status: SHIPPED | OUTPATIENT
Start: 2023-05-24

## 2023-05-24 RX ORDER — BUPIVACAINE HYDROCHLORIDE 5 MG/ML
INJECTION, SOLUTION EPIDURAL; INTRACAUDAL AS NEEDED
Status: DISCONTINUED | OUTPATIENT
Start: 2023-05-24 | End: 2023-05-24 | Stop reason: HOSPADM

## 2023-05-24 RX ORDER — ONDANSETRON 2 MG/ML
4 INJECTION INTRAMUSCULAR; INTRAVENOUS EVERY 6 HOURS PRN
Status: DISCONTINUED | OUTPATIENT
Start: 2023-05-24 | End: 2023-05-24 | Stop reason: HOSPADM

## 2023-05-24 RX ORDER — OXYCODONE HYDROCHLORIDE 5 MG/1
5 TABLET ORAL
Status: DISCONTINUED | OUTPATIENT
Start: 2023-05-24 | End: 2023-05-24 | Stop reason: HOSPADM

## 2023-05-24 RX ORDER — ONDANSETRON 2 MG/ML
INJECTION INTRAMUSCULAR; INTRAVENOUS AS NEEDED
Status: DISCONTINUED | OUTPATIENT
Start: 2023-05-24 | End: 2023-05-24 | Stop reason: SURG

## 2023-05-24 RX ORDER — MAGNESIUM HYDROXIDE 1200 MG/15ML
LIQUID ORAL AS NEEDED
Status: DISCONTINUED | OUTPATIENT
Start: 2023-05-24 | End: 2023-05-24 | Stop reason: HOSPADM

## 2023-05-24 RX ORDER — SODIUM CHLORIDE, SODIUM LACTATE, POTASSIUM CHLORIDE, CALCIUM CHLORIDE 600; 310; 30; 20 MG/100ML; MG/100ML; MG/100ML; MG/100ML
70 INJECTION, SOLUTION INTRAVENOUS CONTINUOUS
Status: DISCONTINUED | OUTPATIENT
Start: 2023-05-24 | End: 2023-05-24 | Stop reason: HOSPADM

## 2023-05-24 RX ORDER — KETOROLAC TROMETHAMINE 30 MG/ML
INJECTION, SOLUTION INTRAMUSCULAR; INTRAVENOUS AS NEEDED
Status: DISCONTINUED | OUTPATIENT
Start: 2023-05-24 | End: 2023-05-24 | Stop reason: SURG

## 2023-05-24 RX ORDER — ROCURONIUM BROMIDE 10 MG/ML
INJECTION, SOLUTION INTRAVENOUS AS NEEDED
Status: DISCONTINUED | OUTPATIENT
Start: 2023-05-24 | End: 2023-05-24 | Stop reason: SURG

## 2023-05-24 RX ORDER — ONDANSETRON 2 MG/ML
4 INJECTION INTRAMUSCULAR; INTRAVENOUS ONCE AS NEEDED
Status: DISCONTINUED | OUTPATIENT
Start: 2023-05-24 | End: 2023-05-24 | Stop reason: HOSPADM

## 2023-05-24 RX ORDER — ACETAMINOPHEN 500 MG
1000 TABLET ORAL ONCE
Status: COMPLETED | OUTPATIENT
Start: 2023-05-24 | End: 2023-05-24

## 2023-05-24 RX ORDER — LIDOCAINE HYDROCHLORIDE 20 MG/ML
INJECTION, SOLUTION EPIDURAL; INFILTRATION; INTRACAUDAL; PERINEURAL AS NEEDED
Status: DISCONTINUED | OUTPATIENT
Start: 2023-05-24 | End: 2023-05-24 | Stop reason: SURG

## 2023-05-24 RX ORDER — FENTANYL CITRATE 50 UG/ML
INJECTION, SOLUTION INTRAMUSCULAR; INTRAVENOUS AS NEEDED
Status: DISCONTINUED | OUTPATIENT
Start: 2023-05-24 | End: 2023-05-24 | Stop reason: SURG

## 2023-05-24 RX ORDER — METOCLOPRAMIDE HYDROCHLORIDE 5 MG/ML
10 INJECTION INTRAMUSCULAR; INTRAVENOUS ONCE AS NEEDED
Status: COMPLETED | OUTPATIENT
Start: 2023-05-24 | End: 2023-05-24

## 2023-05-24 RX ORDER — MIDAZOLAM HYDROCHLORIDE 2 MG/2ML
2 INJECTION, SOLUTION INTRAMUSCULAR; INTRAVENOUS ONCE
Status: COMPLETED | OUTPATIENT
Start: 2023-05-24 | End: 2023-05-24

## 2023-05-24 RX ORDER — PROMETHAZINE HYDROCHLORIDE 25 MG/1
25 SUPPOSITORY RECTAL ONCE AS NEEDED
Status: DISCONTINUED | OUTPATIENT
Start: 2023-05-24 | End: 2023-05-24 | Stop reason: HOSPADM

## 2023-05-24 RX ORDER — HYDROCODONE BITARTRATE AND ACETAMINOPHEN 5; 325 MG/1; MG/1
1 TABLET ORAL ONCE AS NEEDED
Status: DISCONTINUED | OUTPATIENT
Start: 2023-05-24 | End: 2023-05-24 | Stop reason: HOSPADM

## 2023-05-24 RX ORDER — CEFAZOLIN SODIUM 2 G/100ML
2 INJECTION, SOLUTION INTRAVENOUS ONCE
Status: COMPLETED | OUTPATIENT
Start: 2023-05-24 | End: 2023-05-24

## 2023-05-24 RX ORDER — PROMETHAZINE HYDROCHLORIDE 12.5 MG/1
25 TABLET ORAL ONCE AS NEEDED
Status: DISCONTINUED | OUTPATIENT
Start: 2023-05-24 | End: 2023-05-24 | Stop reason: HOSPADM

## 2023-05-24 RX ORDER — KETAMINE HCL IN NACL, ISO-OSM 100MG/10ML
SYRINGE (ML) INJECTION AS NEEDED
Status: DISCONTINUED | OUTPATIENT
Start: 2023-05-24 | End: 2023-05-24 | Stop reason: SURG

## 2023-05-24 RX ORDER — MEPERIDINE HYDROCHLORIDE 25 MG/ML
12.5 INJECTION INTRAMUSCULAR; INTRAVENOUS; SUBCUTANEOUS
Status: DISCONTINUED | OUTPATIENT
Start: 2023-05-24 | End: 2023-05-24 | Stop reason: HOSPADM

## 2023-05-24 RX ORDER — PROPOFOL 10 MG/ML
VIAL (ML) INTRAVENOUS AS NEEDED
Status: DISCONTINUED | OUTPATIENT
Start: 2023-05-24 | End: 2023-05-24 | Stop reason: SURG

## 2023-05-24 RX ORDER — ONDANSETRON 4 MG/1
4 TABLET, FILM COATED ORAL ONCE AS NEEDED
Status: DISCONTINUED | OUTPATIENT
Start: 2023-05-24 | End: 2023-05-24 | Stop reason: HOSPADM

## 2023-05-24 RX ORDER — SODIUM CHLORIDE, SODIUM LACTATE, POTASSIUM CHLORIDE, CALCIUM CHLORIDE 600; 310; 30; 20 MG/100ML; MG/100ML; MG/100ML; MG/100ML
9 INJECTION, SOLUTION INTRAVENOUS CONTINUOUS PRN
Status: DISCONTINUED | OUTPATIENT
Start: 2023-05-24 | End: 2023-05-24 | Stop reason: HOSPADM

## 2023-05-24 RX ORDER — IBUPROFEN 600 MG/1
600 TABLET ORAL EVERY 6 HOURS PRN
Status: DISCONTINUED | OUTPATIENT
Start: 2023-05-24 | End: 2023-05-24 | Stop reason: HOSPADM

## 2023-05-24 RX ADMIN — SUGAMMADEX 200 MG: 100 INJECTION, SOLUTION INTRAVENOUS at 10:03

## 2023-05-24 RX ADMIN — ROCURONIUM BROMIDE 50 MG: 10 INJECTION, SOLUTION INTRAVENOUS at 09:08

## 2023-05-24 RX ADMIN — Medication 20 MG: at 09:14

## 2023-05-24 RX ADMIN — FENTANYL CITRATE 100 MCG: 50 INJECTION, SOLUTION INTRAMUSCULAR; INTRAVENOUS at 09:08

## 2023-05-24 RX ADMIN — LIDOCAINE HYDROCHLORIDE 80 MG: 20 INJECTION, SOLUTION EPIDURAL; INFILTRATION; INTRACAUDAL; PERINEURAL at 09:08

## 2023-05-24 RX ADMIN — PROPOFOL 150 MG: 10 INJECTION, EMULSION INTRAVENOUS at 09:08

## 2023-05-24 RX ADMIN — SODIUM CHLORIDE, POTASSIUM CHLORIDE, SODIUM LACTATE AND CALCIUM CHLORIDE 9 ML/HR: 600; 310; 30; 20 INJECTION, SOLUTION INTRAVENOUS at 08:07

## 2023-05-24 RX ADMIN — DEXAMETHASONE SODIUM PHOSPHATE 4 MG: 4 INJECTION, SOLUTION INTRA-ARTICULAR; INTRALESIONAL; INTRAMUSCULAR; INTRAVENOUS; SOFT TISSUE at 09:18

## 2023-05-24 RX ADMIN — METOCLOPRAMIDE HYDROCHLORIDE 10 MG: 5 INJECTION INTRAMUSCULAR; INTRAVENOUS at 08:06

## 2023-05-24 RX ADMIN — HYDROMORPHONE HYDROCHLORIDE 1 MG: 1 INJECTION, SOLUTION INTRAMUSCULAR; INTRAVENOUS; SUBCUTANEOUS at 09:31

## 2023-05-24 RX ADMIN — ONDANSETRON 4 MG: 2 INJECTION INTRAMUSCULAR; INTRAVENOUS at 09:18

## 2023-05-24 RX ADMIN — MIDAZOLAM HYDROCHLORIDE 2 MG: 1 INJECTION, SOLUTION INTRAMUSCULAR; INTRAVENOUS at 08:30

## 2023-05-24 RX ADMIN — CEFAZOLIN SODIUM 2 G: 2 INJECTION, SOLUTION INTRAVENOUS at 09:11

## 2023-05-24 RX ADMIN — ACETAMINOPHEN 1000 MG: 500 TABLET ORAL at 08:07

## 2023-05-24 RX ADMIN — KETOROLAC TROMETHAMINE 30 MG: 30 INJECTION, SOLUTION INTRAMUSCULAR; INTRAVENOUS at 10:00

## 2023-05-24 NOTE — ANESTHESIA POSTPROCEDURE EVALUATION
Patient: Hunter Guevara    Procedure Summary     Date: 05/24/23 Room / Location: [facility] OR [address] [place] [address] OR [facility]    Anesthesia Start: 0905 Anesthesia Stop: 1017    Procedure: INGUINAL HERNIA REPAIR (Left: Groin) Diagnosis:       Recurrent left inguinal hernia      (Recurrent left inguinal hernia [K40.91])    Surgeons: Melecio Burger MD Provider: Noe Mendosa MD    Anesthesia Type: general ASA Status: 2          Anesthesia Type: general    Vitals  Vitals Value Taken Time   /56 05/24/23 1041   Temp 36.7 °C (98.1 °F) 05/24/23 1014   Pulse 66 05/24/23 1104   Resp 19 05/24/23 1020   SpO2 95 % 05/24/23 1104   Vitals shown include unvalidated device data.        Post Anesthesia Care and Evaluation    Patient location during evaluation: bedside  Patient participation: complete - patient participated  Level of consciousness: awake  Pain score: 2  Pain management: adequate    Airway patency: patent  PONV Status: none  Cardiovascular status: acceptable and stable  Respiratory status: acceptable  Hydration status: acceptable    Comments: An Anesthesiologist personally participated in the most demanding procedures (including induction and emergence if applicable) in the anesthesia plan, monitored the course of anesthesia administration at frequent intervals and remained physically present and available for immediate diagnosis and treatment of emergencies.

## 2023-05-24 NOTE — OP NOTE
INGUINAL HERNIA REPAIR  Procedure Report    Patient Name:  Hunter Guevara  YOB: 1965    Date of Surgery:  5/24/2023     Indications: The patient is a 57-year-old gentleman who presented with a recurrent left inguinal hernia following a preperitoneal minimally invasive repair 2 years ago.  The decision was made to proceed with an open left inguinal hernia repair.    Pre-op Diagnosis: Recurrent left inguinal hernia    Post-Op Diagnosis: Same    Procedure/CPT® Codes:    Open left inguinal hernia repair    Staff:  Surgeon(s):  Melecio Burger MD    Assistant: Quentin Macias    Anesthesia: General    Estimated Blood Loss: 20 mL    Implants:    Implant Name Type Inv. Item Serial No.  Lot No. LRB No. Used Action   MESH KEYHL PRESH 2.4X5.4IN LG - [id number] Implant MESH KEYHL PRESH 2.4X5.4IN LG  [employer]  (DIV OF [employer]) [id number] Left 1 Implanted       Specimen:          Specimens     ID Source Type Tests Collected By Collected At Frozen?    A Abdomen, Left Tissue · TISSUE PATHOLOGY EXAM   Melecio Burger MD 5/24/23 0941     Description: CORD LIPOMA              Findings: Recurrent indirect inguinal hernia    Complications: None    Description of Procedure: The patient was taken the operating room and placed on the table in supine position.  After induction of general anesthesia, the lower abdomen and left groin were prepped and draped sterilely.  I made an incision superior to the left inguinal canal with a 15 blade scalpel and dissected down into the subcutaneous tissues with cautery.  I dissected down to the external bleak fascia and opened the external bleak fascia over the left inguinal canal with cautery.  I explored the left inguinal canal and identified the spermatic cord and bluntly dissected this out and controlled it with a Penrose drain.  The cord was examined and he appeared to have a large amount of fat which had herniated out through the internal inguinal ring and  appeared to be omentum.  This was carefully dissected away from the cord structures up to the level of the internal inguinal ring and I clamped the neck of that herniated omentum with a Bernadette clamp and then excised this fat with cautery.  I then tied off the neck of that herniated omentum with a 0 Vicryl tie and allowed it to reduce back through the internal inguinal ring.  I then examined the floor of the inguinal canal which appeared a little bit patulous but there was not an obvious large hernia sac there.  I went ahead and took a large polypropylene mesh patch and placed it over the floor of the inguinal canal.  I sutured it medially to the pubic tubercle, superiorly to the conjoined tendon and inferiorly to the shelving edge of the inguinal ligament with interrupted 0 Ethibond sutures.  I made sure that the testicle was down in the scrotum and then closed the external oblique fascia with a running 2-0 Vicryl suture.  We had good hemostasis.  The subcutaneous tissues were closed with interrupted 3-0 Vicryl sutures and the skin was closed with a running 4-0 Vicryl subcuticular suture.  Sterile dressings were applied and he was taken the postanesthesia recovery room in stable condition.    Assistant: Quentin Macias  was responsible for performing the following activities: Retraction, Suction, Irrigation and Placing Dressing and their skilled assistance was necessary for the success of this case.    Melecio Burger MD     Date: 5/24/2023  Time: 10:12 EDT

## 2023-05-24 NOTE — ANESTHESIA PREPROCEDURE EVALUATION
Anesthesia Evaluation     Patient summary reviewed and Nursing notes reviewed   no history of anesthetic complications:  NPO Solid Status: > 8 hours  NPO Liquid Status: > 2 hours           Airway   Mallampati: II  TM distance: >3 FB  Neck ROM: full  No difficulty expected  Dental    (+) edentulous    Pulmonary - negative pulmonary ROS and normal exam    breath sounds clear to auscultation  Cardiovascular - normal exam  Exercise tolerance: good (4-7 METS)    Rhythm: regular  Rate: normal    (+) hypertension, hyperlipidemia,       Neuro/Psych- negative ROS  GI/Hepatic/Renal/Endo    (+)   diabetes mellitus type 2,     Musculoskeletal     Abdominal    Substance History - negative use     OB/GYN negative ob/gyn ROS         Other   arthritis,      ROS/Med Hx Other: PAT Nursing Notes unavailable.                   Anesthesia Plan    ASA 2     general     (Patient understands anesthesia not responsible for dental damage.)  intravenous induction     Anesthetic plan, risks, benefits, and alternatives have been provided, discussed and informed consent has been obtained with: patient.    Use of blood products discussed with patient .   Plan discussed with CRNA.        CODE STATUS:

## 2023-05-24 NOTE — DISCHARGE INSTRUCTIONS
DISCHARGE INSTRUCTIONS  HERNIA      For your surgery you had:  General anesthesia (you may have a sore throat for the first 24 hours)  IV sedation.  Local anesthesia  Monitored anesthesia care    You may experience dizziness, drowsiness, or light-headedness for several hours following surgery/procedure.  Do not stay alone today or tonight.  Limit your activity for 24 hours.  Resume your diet slowly.  Follow whatever special dietary instructions you may have been given by your doctor.  You should not drive, operate machinery, drink alcohol, or sign legally binding documents for 24 hours or while you are taking pain medication.  Last dose of pain medication was given at:   Tylenol at 0805am .  NOTIFY YOUR DOCTOR IF YOU EXPERIENCE ANY OF THE FOLLOWING:  Temperature greater than 101 degrees Fahrenheit  Shaking Chills  Redness or excessive drainage from incision  Nausea, vomiting and/or pain that is not controlled by prescribed medications  Increase in bleeding or bleeding that is excessive  Unable to urinate in 6 hours after surgery  If unable to reach your doctor, please go to the closest Emergency Room [] You may remove dressing:   [] in 24 hours   [x] in 48 hours   [] Other:    [x] You may shower  48 hrs no tub baths x2 weeks    Apply an ice pack for 24-48 hours.  [x] Wear tight fitting briefs to prevent  swelling.  Do not do any heavy lifting, pushing or pulling.  You may walk up and down stairs.  You may ride in a car but do not drive until instructed by your physician.  Avoid constipation.  If unable to urinate in 6 to 8 hours after surgery or urinating frequently in small amounts, notify your doctor or go to the nearest Emergency Room.  Medications per physician instructions as indicated on Discharge Medication Information Sheet.  You should see   for follow-up care   on  .  Phone number:       SPECIAL INSTRUCTIONS:                 I have read and received the above instructions.     Patient/Responsible  Party's Signature Date/Time     RN Signature Date/Time

## 2023-05-26 LAB
CYTO UR: NORMAL
LAB AP CASE REPORT: NORMAL
LAB AP CLINICAL INFORMATION: NORMAL
PATH REPORT.FINAL DX SPEC: NORMAL
PATH REPORT.GROSS SPEC: NORMAL

## 2023-05-30 ENCOUNTER — TELEPHONE (OUTPATIENT)
Dept: ORTHOPEDIC SURGERY | Facility: CLINIC | Age: 58
End: 2023-05-30

## 2023-06-05 LAB — QT INTERVAL: 400 MS

## 2023-06-09 ENCOUNTER — OFFICE VISIT (OUTPATIENT)
Dept: SURGERY | Facility: CLINIC | Age: 58
End: 2023-06-09
Payer: MEDICAID

## 2023-06-09 VITALS — RESPIRATION RATE: 14 BRPM | HEIGHT: 66 IN | BODY MASS INDEX: 28.87 KG/M2 | WEIGHT: 179.6 LBS

## 2023-06-09 DIAGNOSIS — K40.91 RECURRENT LEFT INGUINAL HERNIA: Primary | ICD-10-CM

## 2023-06-09 RX ORDER — SULFAMETHOXAZOLE AND TRIMETHOPRIM 800; 160 MG/1; MG/1
1 TABLET ORAL 2 TIMES DAILY
Qty: 10 TABLET | Refills: 0 | Status: SHIPPED | OUTPATIENT
Start: 2023-06-09 | End: 2023-06-14

## 2023-06-09 NOTE — PROGRESS NOTES
Chief Complaint  Post-op Follow-up    Subjective          Hunter Guevara presents to [facility] GENERAL SURGERY  History of Present Illness    Hunter Guevara is a 57 y.o. male  who presents today for a postoperative visit.     Patient is here for a follow-up after a open repair of a recurrent left inguinal hernia.  He is doing okay but has a little bit of redness on the medial end of the incision.    Past History:  Medical History: has a past medical history of Diabetes mellitus, Gout, Hyperlipidemia, Hypertension, Inguinal hernia, and Osteoarthritis.   Surgical History: has a past surgical history that includes Shoulder surgery (Right); Ankle surgery (Right); Hernia repair; Tonsillectomy; Cyst Removal (Right); Ankle surgery (Left); Total knee arthroplasty (Left, 06/07/2022); Inguinal Hernia Repair (Left, 05/24/2023); Eye surgery (2016); and Fracture surgery (1998).   Family History: family history includes Diabetes in his father.   Social History: reports that he has been smoking cigars. He has quit using smokeless tobacco. He reports current alcohol use. He reports that he does not currently use drugs after having used the following drugs: Amphetamines, Cocaine(coke), and Marijuana.  Allergies: Penicillins       Current Outpatient Medications:     allopurinol (ZYLOPRIM) 100 MG tablet, Take 1 tablet by mouth Daily., Disp: , Rfl:     atorvastatin (LIPITOR) 40 MG tablet, Take 1 tablet by mouth Every Night., Disp: , Rfl:     glyburide (DIAbeta) 5 MG tablet, Take 1 tablet by mouth Daily With Breakfast. INSTRUCTED PER ANESTHESIA PROTOCOL, Disp: , Rfl:     lisinopril (PRINIVIL,ZESTRIL) 20 MG tablet, Take 1 tablet by mouth Daily., Disp: , Rfl:     metFORMIN (GLUCOPHAGE) 500 MG tablet, Take 1 tablet by mouth Daily With Breakfast. INSTRUCTED PER ANESTHESIA PROTOCOL, Disp: , Rfl:     multivitamin with minerals tablet tablet, Take 1 tablet by mouth Daily., Disp: , Rfl:     omega-3 acid ethyl esters  "(LOVAZA) 1 g capsule, , Disp: , Rfl:     SUPER B COMPLEX/C PO, Take 1 tablet by mouth Daily., Disp: , Rfl:     vitamin D (ERGOCALCIFEROL) 1.25 MG (45755 UT) capsule capsule, Take 1 capsule by mouth Every 7 (Seven) Days. Reports is out and hasnt taken in about a week or so, Disp: , Rfl:     HYDROcodone-acetaminophen (NORCO) 5-325 MG per tablet, Take 1 tablet by mouth Every 4 (Four) Hours As Needed for Moderate Pain (Pain). (Patient not taking: Reported on 6/9/2023), Disp: 15 tablet, Rfl: 0       Physical Exam  His incision looks okay except there is a little bit of redness right at the medial end of it.  The hernia repair feels intact.  Objective     Vital Signs:   Resp 14   Ht 167.6 cm (66\")   Wt 81.5 kg (179 lb 9.6 oz)   BMI 28.99 kg/m²              Assessment and Plan    Diagnoses and all orders for this visit:    1. Recurrent left inguinal hernia (Primary)    We will treat him with 5 days of oral Bactrim.  I will see him back in 2 weeks to make sure that looks better.      "

## 2023-10-09 ENCOUNTER — TELEPHONE (OUTPATIENT)
Dept: SURGERY | Facility: CLINIC | Age: 58
End: 2023-10-09
Payer: MEDICAID

## 2023-10-09 RX ORDER — CEPHALEXIN 500 MG/1
500 CAPSULE ORAL 4 TIMES DAILY
Qty: 40 CAPSULE | Refills: 0 | Status: SHIPPED | OUTPATIENT
Start: 2023-10-09 | End: 2023-10-19

## 2023-10-09 NOTE — TELEPHONE ENCOUNTER
"PATIENT CALLED AND SAID HE HAD HERNIA REPAIR 05/24/23.  HE HAD A HARD TIME WITH GETTING IT TO HEAL.  HE SAID IT HAS COME OPEN AGAIN ABOUT 1/2\" LONG, AND OPENS LESS THAN THE SIZE OF A DIME AROUND.  IT IS A LITTLE RED.  NOT WARM TO TOUCH.  HE SAID IT FELT LIKE A SACK IN IT, THEN PUS AND RED LIQUID CAME FROM IT.  HE SAID IT TRIES TO HEAL AND SCAB OVER, THEN COMES OPEN.    HE SAID DR. MACEDO PRESCRIBED 2 ROUNDS OF ANTIBIOTICS FOR HIM IN JULY.  HE SAID ONE WAS A BACTERIAL ANTIBIOTIC, AND ONE WAS A VIRAL ANTIBIOTIC.    PATIENT SAID HIS PCP LOOKED AT IT IN AUGUST, AND PRESCRIBED KEFLEX.    PATIENT HAS [employer]ICA[employer] [place], AND MADE HIM AWARE [facility] AND [facility] ARE OUT OF NETWORK.  I TOLD HIM HE MAY WANT TO CONTACT [facility] TO SEE IF HE HAS ANY OUT-OF-NETWORK BENEFITS, OR TO SEE IF HE CAN GET A CONTINUATION OF CARE.  I TOLD HIM IT MAY BE A HIGHER COST TO HIM, AS WE ARE OUT OF NETWORK.  "

## 2024-03-22 ENCOUNTER — TELEPHONE (OUTPATIENT)
Dept: ORTHOPEDIC SURGERY | Facility: CLINIC | Age: 59
End: 2024-03-22
Payer: MEDICAID

## 2024-03-22 NOTE — TELEPHONE ENCOUNTER
LEFT VM TO R/S 06.19.24 APPT. - SANDY OUT OF OFFICE     OK TO SCHEDULE W/SANDY AT NEXT AVAILABLE      HUB OK TO RELAY AND R/S   
Sepsis, due to unspecified organism

## 2025-06-11 ENCOUNTER — OFFICE VISIT (OUTPATIENT)
Dept: ORTHOPEDIC SURGERY | Facility: CLINIC | Age: 60
End: 2025-06-11
Payer: MEDICAID

## 2025-06-11 ENCOUNTER — PREP FOR SURGERY (OUTPATIENT)
Dept: OTHER | Facility: HOSPITAL | Age: 60
End: 2025-06-11
Payer: MEDICAID

## 2025-06-11 VITALS
HEIGHT: 66 IN | DIASTOLIC BLOOD PRESSURE: 97 MMHG | OXYGEN SATURATION: 95 % | HEART RATE: 93 BPM | WEIGHT: 175 LBS | SYSTOLIC BLOOD PRESSURE: 155 MMHG | BODY MASS INDEX: 28.12 KG/M2

## 2025-06-11 DIAGNOSIS — M17.11 PRIMARY OSTEOARTHRITIS OF RIGHT KNEE: ICD-10-CM

## 2025-06-11 DIAGNOSIS — M25.561 RIGHT KNEE PAIN, UNSPECIFIED CHRONICITY: Primary | ICD-10-CM

## 2025-06-11 RX ORDER — TRANEXAMIC ACID 10 MG/ML
1000 INJECTION, SOLUTION INTRAVENOUS ONCE
OUTPATIENT
Start: 2025-06-11 | End: 2025-06-11

## 2025-06-11 NOTE — PROGRESS NOTES
"Chief Complaint  Initial Evaluation of the Right Knee     Subjective      Hunter Guevara presents to [facility] for  initial evaluation of the right knee.  He has pain on the medial aspect of the knee.  He has had injections, therapy and anti inflammatory in the past.  He notes pain with stooping, squatting and stairs.  He notes the pain is affecting his daily tasks and ADLs.      Allergies   Allergen Reactions    Penicillins Hives     AS A INFANT. REPORTS HAS TAKEN KEFLEX AND HAD NO ISSUES WITH IT        Social History     Socioeconomic History    Marital status:    Tobacco Use    Smoking status: Every Day     Types: Cigars    Smokeless tobacco: Former    Tobacco comments:     5-10 CIGARS A DAY NORMALLY     INSTRUCTED NO SMOKING 24 HR PRIOR TO SURGERY    Vaping Use    Vaping status: Never Used   Substance and Sexual Activity    Alcohol use: Yes     Comment: REPORTS DRINKS 3-6 BEERS A DAY.  REPORTS THAT USED TO DRINK 30 A DAY ABOUT 3 YEARS AGO    Drug use: Not Currently     Types: Amphetamines, Cocaine(coke), Marijuana     Comment: REPORTS HAS BEEN OVER 1 YEAR WITHOUT USING    Sexual activity: Not Currently     Partners: Female     Birth control/protection: Other        I reviewed the patient's chief complaint, history of present illness, review of systems, past medical history, surgical history, family history, social history, medications, and allergy list.     Review of Systems     Constitutional: Denies fevers, chills, weight loss  Cardiovascular: Denies chest pain, shortness of breath  Skin: Denies rashes, acute skin changes  Neurologic: Denies headache, loss of consciousness        Vital Signs:   /97   Pulse 93   Ht 167.6 cm (65.98\")   Wt 79.4 kg (175 lb)   SpO2 95%   BMI 28.26 kg/m²          Physical Exam  General: Alert. No acute distress    Ortho Exam        RIGHT KNEE Flexion 110. Extension 0. Stable to varus/valgus stress. Stable to anterior/posterior " drawer. Neurovascularly intact. Negative Caitie. Negative Lachman. Positive EHL, FHL, HS and TA. Sensation intact to light touch all 5 nerves of the foot. Ambulates with Antalgic gait. Patella is well tracking. Calf supple, non-tender. Positive tenderness to the medial joint line. Positive tenderness to the lateral joint line. Positive Crepitus. Knee Extensor Mechanism intact Varus deformity Mild swelling. Kellgren Bud grading scale is a 4.     Procedures        Imaging Results (Most Recent)       Procedure Component Value Units Date/Time    XR Knee 3 View Right - In process [[id number]] Resulted: 06/11/25 1027     Updated: 06/11/25 1036    This result has not been signed. Information might be incomplete.               Result Review :     X-Ray Report:  Right knee X-Ray  Indication: Evaluation of the right knee  AP/Lateral and [place] view(s)  Findings: Advanced degenerative end stage bone on bone arthritis.    Prior studies available for comparison: no        Assessment and Plan     Diagnoses and all orders for this visit:    1. Right knee pain, unspecified chronicity (Primary)  -     XR Knee 3 View Right    2. Primary osteoarthritis of right knee        Discussed the treatment plan with the patient. I reviewed the X-rays that were obtained today with the patient.     Discussed the treatment options with the patient, operative vs non-operative. The patient expressed understanding and wished to proceed with a right total knee arthroplasty.        Educated on risk of smoking/nicotine. Discussed options for smoking cessation regarding chantix, nicorette gum and/ or to call the quit hotline at [phone] , Discussed surgery., Risks/benefits discussed with patient including, but not limited to: infection, bleeding, neurovascular damage, re-rupture, aesthetic deformity, need for further surgery, and death., Discussed with patient the implant type being used during surgery and patient understands., Surgery  pamphlet given., Call or return if worsening symptoms., DME order for a 3 in 1 given today due to patient will be confined to one room/level of the home that does not offer a toilet during postop recovery. , and Patient does not have any metal allergies.     Follow Up     2 weeks postoperatively       Patient was given instructions and counseling regarding his condition or for health maintenance advice. Please see specific information pulled into the AVS if appropriate.     Scribed for Ricco Ruelas MD by Sandy Nelson MA.  06/11/25   10:24 EDT    I have personally performed the services described in this document as scribed by the above individual and it is both accurate and complete. Ricco Ruelas MD 06/11/25

## 2025-06-19 DIAGNOSIS — Z47.1 AFTERCARE FOLLOWING RIGHT KNEE JOINT REPLACEMENT SURGERY: Primary | ICD-10-CM

## 2025-06-19 DIAGNOSIS — Z96.651 AFTERCARE FOLLOWING RIGHT KNEE JOINT REPLACEMENT SURGERY: Primary | ICD-10-CM

## 2025-07-14 ENCOUNTER — PRE-ADMISSION TESTING (OUTPATIENT)
Dept: PREADMISSION TESTING | Facility: HOSPITAL | Age: 60
End: 2025-07-14
Payer: MEDICAID

## 2025-07-14 VITALS
SYSTOLIC BLOOD PRESSURE: 148 MMHG | DIASTOLIC BLOOD PRESSURE: 85 MMHG | TEMPERATURE: 97.1 F | BODY MASS INDEX: 26.45 KG/M2 | RESPIRATION RATE: 16 BRPM | OXYGEN SATURATION: 96 % | WEIGHT: 164.57 LBS | HEIGHT: 66 IN | HEART RATE: 99 BPM

## 2025-07-14 DIAGNOSIS — Z01.818 PRE-OP TESTING: Primary | ICD-10-CM

## 2025-07-14 DIAGNOSIS — M25.561 RIGHT KNEE PAIN, UNSPECIFIED CHRONICITY: ICD-10-CM

## 2025-07-14 LAB
ALBUMIN SERPL-MCNC: 4.7 G/DL (ref 3.5–5.2)
ALBUMIN/GLOB SERPL: 1.6 G/DL
ALP SERPL-CCNC: 103 U/L (ref 39–117)
ALT SERPL W P-5'-P-CCNC: 17 U/L (ref 1–41)
ANION GAP SERPL CALCULATED.3IONS-SCNC: 15.3 MMOL/L (ref 5–15)
AST SERPL-CCNC: 17 U/L (ref 1–40)
BASOPHILS # BLD AUTO: 0.06 10*3/MM3 (ref 0–0.2)
BASOPHILS NFR BLD AUTO: 0.5 % (ref 0–1.5)
BILIRUB SERPL-MCNC: 1 MG/DL (ref 0–1.2)
BUN SERPL-MCNC: 23 MG/DL (ref 6–20)
BUN/CREAT SERPL: 16.4 (ref 7–25)
CALCIUM SPEC-SCNC: 10.3 MG/DL (ref 8.6–10.5)
CHLORIDE SERPL-SCNC: 97 MMOL/L (ref 98–107)
CO2 SERPL-SCNC: 24.7 MMOL/L (ref 22–29)
CREAT SERPL-MCNC: 1.4 MG/DL (ref 0.76–1.27)
DEPRECATED RDW RBC AUTO: 40 FL (ref 37–54)
EGFRCR SERPLBLD CKD-EPI 2021: 57.9 ML/MIN/1.73
EOSINOPHIL # BLD AUTO: 0.1 10*3/MM3 (ref 0–0.4)
EOSINOPHIL NFR BLD AUTO: 0.9 % (ref 0.3–6.2)
ERYTHROCYTE [DISTWIDTH] IN BLOOD BY AUTOMATED COUNT: 13 % (ref 12.3–15.4)
GLOBULIN UR ELPH-MCNC: 2.9 GM/DL
GLUCOSE SERPL-MCNC: 354 MG/DL (ref 65–99)
HBA1C MFR BLD: 10.7 % (ref 4.8–5.6)
HCT VFR BLD AUTO: 50.7 % (ref 37.5–51)
HGB BLD-MCNC: 17.8 G/DL (ref 13–17.7)
IMM GRANULOCYTES # BLD AUTO: 0.1 10*3/MM3 (ref 0–0.05)
IMM GRANULOCYTES NFR BLD AUTO: 0.9 % (ref 0–0.5)
LYMPHOCYTES # BLD AUTO: 2.37 10*3/MM3 (ref 0.7–3.1)
LYMPHOCYTES NFR BLD AUTO: 21 % (ref 19.6–45.3)
MCH RBC QN AUTO: 29.8 PG (ref 26.6–33)
MCHC RBC AUTO-ENTMCNC: 35.1 G/DL (ref 31.5–35.7)
MCV RBC AUTO: 84.8 FL (ref 79–97)
MONOCYTES # BLD AUTO: 0.59 10*3/MM3 (ref 0.1–0.9)
MONOCYTES NFR BLD AUTO: 5.2 % (ref 5–12)
NEUTROPHILS NFR BLD AUTO: 71.5 % (ref 42.7–76)
NEUTROPHILS NFR BLD AUTO: 8.07 10*3/MM3 (ref 1.7–7)
NRBC BLD AUTO-RTO: 0 /100 WBC (ref 0–0.2)
PLATELET # BLD AUTO: 240 10*3/MM3 (ref 140–450)
PMV BLD AUTO: 10.1 FL (ref 6–12)
POTASSIUM SERPL-SCNC: 4.3 MMOL/L (ref 3.5–5.2)
PROT SERPL-MCNC: 7.6 G/DL (ref 6–8.5)
RBC # BLD AUTO: 5.98 10*6/MM3 (ref 4.14–5.8)
SODIUM SERPL-SCNC: 137 MMOL/L (ref 136–145)
WBC NRBC COR # BLD AUTO: 11.29 10*3/MM3 (ref 3.4–10.8)

## 2025-07-14 PROCEDURE — 83036 HEMOGLOBIN GLYCOSYLATED A1C: CPT

## 2025-07-14 PROCEDURE — 80053 COMPREHEN METABOLIC PANEL: CPT

## 2025-07-14 PROCEDURE — 85025 COMPLETE CBC W/AUTO DIFF WBC: CPT

## 2025-07-14 PROCEDURE — 36415 COLL VENOUS BLD VENIPUNCTURE: CPT

## 2025-07-14 PROCEDURE — 93005 ELECTROCARDIOGRAM TRACING: CPT

## 2025-07-14 RX ORDER — SERTRALINE HYDROCHLORIDE 100 MG/1
100 TABLET, FILM COATED ORAL DAILY
COMMUNITY

## 2025-07-14 NOTE — PAT
MESSAGE SENT TO NICOLAS AT [facility] THAT  HAD A1C DRAWN MAY 29, 2025 AND IT WAS 11.4. HIS WBC WERE 11.29 AND PT DENIES ANY RECENT INFECTIONS, FEELING ILL OR FEVERS, HEMOGLOBIN 17.8 HE IS A SMOKER. ALSO HAS DRIED SCABBED AREAS TO RIGHT LOWER LEG AND ANKLE REGION REPORTS FROM A FALL. THESE AREAS DRY AND NO S/S INFECTION

## 2025-07-14 NOTE — DISCHARGE INSTRUCTIONS
IMPORTANT INSTRUCTIONS - PRE-ADMISSION TESTING  DO NOT EAT OR CHEW anything after midnight the night before your procedure.    DRINK 20 OZ GATORADE OR POWERADE SUGAR FREE NO RED BEFORE MN NIGHT PRIOR TO PROCEDURE.  AFTER MIDNIGHT NOTHING EXCEPT SIPS OF WATER TO TAKE MEDICATIONS LISTED BELOW  Take the following medications the morning of your procedure with JUST A SIP OF WATER:  ALLOPURINOL, ATORVASTATIN, SERTRALINE    DO NOT BRING your medications to the hospital with you, UNLESS something has changed since your PRE-Admission Testing appointment.  AFTER 7/14/25 Hold all vitamins, supplements, and NSAIDS (Non- steroidal anti-inflammatory meds) for one week prior to surgery (you MAY take Tylenol or Acetaminophen). STOP VIT B COMPLEX, VITAMIN D, LOVAZA, MULTIVITAMIN  If you are diabetic, check your blood sugar the morning of your procedure. If it is less than 70 or if you are feeling symptomatic, call the following number for further instructions: [phone] _______.  Use your inhalers/nebulizers as usual, the morning of your procedure. BRING YOUR INHALERS with you.   Bring your CPAP or BIPAP to hospital, ONLY IF YOU WILL BE SPENDING THE NIGHT.   Make sure you have a ride home and have someone who will stay with you the day of your procedure after you go home.  If you have any questions, please call your Pre-Admission Testing Nurse, ___ZUNILDA_____________ at [phone]____________.   Per anesthesia request, do not smoke for 24 hours before your procedure or as instructed by your surgeon.    WILL CALL ON  7/21/25       NORMALLY BETWEEN 1 AND 4 PM TO GIVE OFFICIAL ARRIVAL TIME FOR DAY OF PROCEDURE  REFER TO PAGE 9 IN TOTAL JOINT BOOK FOR BATHING INSTRUCTIONS GIVEN. NO JEWELRY OF ANY TYPE DAY OF PROCEDURE  COME TO [facility] [address] DAY OF PROCEDURE AND TAKE ELEVATOR TO  FIRST FLOOR  BABIN,  OR CARD FOR MEDS TO BED IF INDICATED AT DISCHARGE  NO SMOKING 24 HOURS PRIOR TO PROCEDURE  NO METFORMIN AFTER 6 PM ON  7/21/25

## 2025-07-15 ENCOUNTER — TELEPHONE (OUTPATIENT)
Dept: ORTHOPEDIC SURGERY | Facility: CLINIC | Age: 60
End: 2025-07-15
Payer: MEDICAID

## 2025-07-15 LAB
QT INTERVAL: 358 MS
QTC INTERVAL: 465 MS

## 2025-07-15 NOTE — PAT
Message sent to Tamra at [employer] in regards to A1c was 10.7 and glucose 354 on labs from 7/14/25. She advised would be contacting pt this am and letting him know would have to be canceled until A1c has improved.    Patient was here with wife and said he talked to you yesterday in regard to switching from Jardiance to (Farxiga)  Dapagliflozin. He needs a prescription for it sent to [facility] in [place] -30 days supply

## 2025-07-15 NOTE — TELEPHONE ENCOUNTER
SPOKE WITH PATIENT TO REVIEW PRE OP LABS. PATIENT'S GLUCOSE READ 354 AND A1C 10.7. LET PATIENT KNOW THAT A1C AND SUGAR WOULD NEED TO BE BETTER UNDER CONTROL BEFORE PROCEEDING WITH TOTAL KNEE REPLACEMENT. DR. MOON PREFERS A1C TO BE AT 8.0 BEFORE PROCEEDING. PATIENT STATES HIS PCP HAS BEEN HAVING TROUBLE GETTING MEDICATIONS TO HELP WITH DIABETES MANAGEMENT APPROVED BY HIS INSURANCE, BUT VOICED UNDERSTANDING.   ADVISED HIM TO CALL BACK WHEN A1C IS DOWN AND WE CAN DISCUSS RESCHEDULING.   BHAVNA, SURGERY SCHEDULER MADE AWARE AND PATIENT'S SURGERY FOR 07/22/25 CANCELED.

## 2025-07-15 NOTE — TELEPHONE ENCOUNTER
RECEIVED MESSAGE FROM GABRIEL RN, CHARGE NURSE PAT LETTING US KNOW THAT ANESTHESIA IS ALSO REQUESTING CARDIAC CLEARANCE ON PATIENT DUE TO ABN EKG.  PATIENT AWARE AND VOICED UNDERSTANDING.

## 2025-08-25 ENCOUNTER — OFFICE VISIT (OUTPATIENT)
Dept: CARDIOLOGY | Facility: CLINIC | Age: 60
End: 2025-08-25
Payer: MEDICAID

## 2025-08-25 VITALS — BODY MASS INDEX: 27.14 KG/M2 | HEIGHT: 66 IN | OXYGEN SATURATION: 98 % | WEIGHT: 168.9 LBS

## 2025-08-25 DIAGNOSIS — I10 HTN (HYPERTENSION), BENIGN: ICD-10-CM

## 2025-08-25 DIAGNOSIS — Z01.810 ENCOUNTER FOR PREPROCEDURAL CARDIOVASCULAR EXAMINATION: Primary | ICD-10-CM

## 2025-08-25 DIAGNOSIS — R94.31 ABNORMAL EKG: ICD-10-CM

## 2025-08-25 DIAGNOSIS — E78.49 OTHER HYPERLIPIDEMIA: ICD-10-CM

## 2025-08-25 DIAGNOSIS — Z13.6 ENCOUNTER FOR SCREENING FOR CARDIOVASCULAR DISORDERS: ICD-10-CM

## 2025-08-25 DIAGNOSIS — E11.69 TYPE 2 DIABETES MELLITUS WITH OTHER SPECIFIED COMPLICATION, WITHOUT LONG-TERM CURRENT USE OF INSULIN: ICD-10-CM

## 2025-08-25 PROCEDURE — 99406 BEHAV CHNG SMOKING 3-10 MIN: CPT | Performed by: INTERNAL MEDICINE

## 2025-08-25 PROCEDURE — 99204 OFFICE O/P NEW MOD 45 MIN: CPT | Performed by: INTERNAL MEDICINE

## 2025-08-25 PROCEDURE — 1160F RVW MEDS BY RX/DR IN RCRD: CPT | Performed by: INTERNAL MEDICINE

## 2025-08-25 PROCEDURE — 1159F MED LIST DOCD IN RCRD: CPT | Performed by: INTERNAL MEDICINE

## 2025-08-25 RX ORDER — INSULIN GLARGINE 100 [IU]/ML
INJECTION, SOLUTION SUBCUTANEOUS
COMMUNITY
Start: 2025-07-16

## 2025-08-25 RX ORDER — PEN NEEDLE, DIABETIC 32GX 5/32"
NEEDLE, DISPOSABLE MISCELLANEOUS
COMMUNITY
Start: 2025-07-16

## 2025-08-25 RX ORDER — HYDROCHLOROTHIAZIDE 12.5 MG/1
CAPSULE ORAL
COMMUNITY
Start: 2025-07-16

## (undated) DEVICE — UNDYED BRAIDED (POLYGLACTIN 910), SYNTHETIC ABSORBABLE SUTURE: Brand: COATED VICRYL

## (undated) DEVICE — 3 BONE CEMENT MIXER: Brand: MIXEVAC

## (undated) DEVICE — FAN SPRAY KIT: Brand: PULSAVAC®

## (undated) DEVICE — CVR LEG BOOTLEG F/R NOSKID 33IN

## (undated) DEVICE — 450 ML BOTTLE OF 0.05% CHLORHEXIDINE GLUCONATE IN 99.95% STERILE WATER FOR IRRIGATION, USP AND APPLICATOR.: Brand: IRRISEPT ANTIMICROBIAL WOUND LAVAGE

## (undated) DEVICE — GLV SURG SENSICARE SLT PF LF 7 STRL

## (undated) DEVICE — MAT FLR ABS W/BLU/LINER 56X72IN WHT

## (undated) DEVICE — MAJOR-LF: Brand: MEDLINE INDUSTRIES, INC.

## (undated) DEVICE — PROXIMATE RH ROTATING HEAD SKIN STAPLERS (35 WIDE) CONTAINS 35 STAINLESS STEEL STAPLES: Brand: PROXIMATE

## (undated) DEVICE — APPL CHLORAPREP HI/LITE 26ML ORNG

## (undated) DEVICE — INTENDED FOR TISSUE SEPARATION, AND OTHER PROCEDURES THAT REQUIRE A SHARP SURGICAL BLADE TO PUNCTURE OR CUT.: Brand: BARD-PARKER ® CARBON RIB-BACK BLADES

## (undated) DEVICE — GAUZE,SPONGE,4"X4",16PLY,STRL,LF,10/TRAY: Brand: MEDLINE

## (undated) DEVICE — PENCL E/S SMOKEEVAC W/TELESCP CANN

## (undated) DEVICE — SUT ETHIB 0 MO7 18IN CX41D

## (undated) DEVICE — ANCHORING PIN 20MM/4MM

## (undated) DEVICE — DISPOSABLE TOURNIQUET CUFF SINGLE BLADDER, SINGLE PORT AND QUICK CONNECT CONNECTOR: Brand: COLOR CUFF

## (undated) DEVICE — NO-SCRATCH ™ SMALL WHITNEY CURETTE ™ IS A SINGLE-USE, PLASTIC CURETTE FOR QUICKLY APPLYING, MANIPULATING AND REMOVING BONE CEMENT DURING HIP AND KNEE REPLACEMENT SURGERY. THE PLASTIC IS SOFTER THAN STEEL INSTRUMENTS, REDUCING THE RISK OF DAMAGING THE PROSTHESIS WITH METAL INSTRUMENTS.  THE CURETTE’S 6MM TIP REMOVES EXCESS CEMENT FROM REPLACEMENT HIPS AND KNEES. EASY-TO-MANEUVER, THE SMALL BLUE CURETTE LETS YOU REMOVE CEMENT FROM ALL EDGES OF THE PROSTHESIS.NO-SCRATCH WHITNEY SMALL CURETTE FEATURES:SAFER THAN STEEL- MADE OF PLASTIC - STURDY YET SOFTER THAN SURGICAL STEEL.HANDIER- EACH TOOL HAS A MOLDED-IN THUMB INDENTATION INSTANTLY ORIENTING THE TOOL.- EASIER TO MANEUVER IN HARD TO SEE PLACES.- COLOR-CODED FOR EASY IDENTIFICATION.FASTER- COMES INDIVIDUALLY PACKAGED IN STERILE, PEEL OPEN POUCH, READY TO GO.- APPLIES, MANIPULATES, OR REMOVES CEMENT WITH FINGERTIP PRECISION.ECONOMICAL- THE COST OF A SINGLE REVISION DWARFS THE COST OF A SINGLE-USE CURETTE. - DISPOSABLE – THERE’S NO NEED TO WASTE TIME REMOVING HARDENED CEMENT OR RE-STERILIZING TOOLS.- LESS EXPENSIVE TO BUY AND INVENTORY - ORDER ONLY THE TOOL YOU USE.- PACKAGED 25 INDIVIDUALLY WRAPPED TOOLS TO A CARTON FOR CONVENIENT SHELF STORAGE.: Brand: WHITNEY NO-SCRATCH CURETTE (SMALL)

## (undated) DEVICE — ELECTRD BLD EDGE COAT 3IN

## (undated) DEVICE — SUT VIC PLS CTD BR 0 TIE 18IN VIL

## (undated) DEVICE — ENCORE® LATEX ORTHO SIZE 8, STERILE LATEX POWDER-FREE SURGICAL GLOVE: Brand: ENCORE

## (undated) DEVICE — SYR LUERLOK 30CC

## (undated) DEVICE — SLV SCD KN/LEN ADJ EXPRSS BLENDED MD 1P/U

## (undated) DEVICE — INSTRUMENT BATTERY

## (undated) DEVICE — NDL HYPO ECLPS SFTY 18G 1 1/2IN

## (undated) DEVICE — ANTIBACTERIAL UNDYED BRAIDED (POLYGLACTIN 910), SYNTHETIC ABSORBABLE SUTURE: Brand: COATED VICRYL

## (undated) DEVICE — GOWN,REINFRCE,POLY,SIRUS,BREATH SLV,XXLG: Brand: MEDLINE

## (undated) DEVICE — DRSNG PAD ABD 8X10IN STRL

## (undated) DEVICE — MEDI-VAC NON-CONDUCTIVE SUCTION TUBING: Brand: CARDINAL HEALTH

## (undated) DEVICE — SOL IRR NACL 0.9PCT 3000ML

## (undated) DEVICE — ANTIBACTERIAL VIOLET BRAIDED (POLYGLACTIN 910), SYNTHETIC ABSORBABLE SUTURE: Brand: COATED VICRYL

## (undated) DEVICE — STERILE POLYISOPRENE POWDER-FREE SURGICAL GLOVES: Brand: PROTEXIS

## (undated) DEVICE — ZIPPERED TOGA, PEEL-AWAY 2X LARGE: Brand: FLYTE, SURGICOOL

## (undated) DEVICE — GLV SURG ULTRAFREE MAX LTX PF 8

## (undated) DEVICE — GLV SURG SENSICARE SLT PF LF 8 STRL

## (undated) DEVICE — GLV SURG SENSICARE PI ORTHO SZ8 LF STRL

## (undated) DEVICE — PULLOVER TOGA, 2X LARGE: Brand: FLYTE, SURGICOOL

## (undated) DEVICE — TOWEL,OR,DSP,ST,BLUE,STD,4/PK,20PK/CS: Brand: MEDLINE

## (undated) DEVICE — SUT VIC 0 CT1 36IN J946H

## (undated) DEVICE — SUT VIC UD BR COAT 0 CP2 27IN

## (undated) DEVICE — STRIP CLS WND SUTURESTRIP/PLS 0.5X4IN TP1103

## (undated) DEVICE — 3M™ STERI-DRAPE™ U-DRAPE 1015: Brand: STERI-DRAPE™

## (undated) DEVICE — GLV SURG SENSICARE PI PF LF 7 GRN STRL

## (undated) DEVICE — PENROSE DRAIN 12" X 1/4: Brand: CARDINAL HEALTH

## (undated) DEVICE — GLV SURG SENSICARE PI ORTHO PF SZ7 LF STRL

## (undated) DEVICE — SUT ETHIB 1 X538H ETX538H

## (undated) DEVICE — SOL IRR NACL 0.9PCT BT 1000ML

## (undated) DEVICE — STRYKER PERFORMANCE SERIES SAGITTAL BLADE: Brand: STRYKER PERFORMANCE SERIES

## (undated) DEVICE — BASIC SINGLE BASIN-LF: Brand: MEDLINE INDUSTRIES, INC.

## (undated) DEVICE — TOTAL KNEE-LF: Brand: MEDLINE INDUSTRIES, INC.

## (undated) DEVICE — STERILE POLYISOPRENE POWDER-FREE SURGICAL GLOVES WITH EMOLLIENT COATING: Brand: PROTEXIS